# Patient Record
Sex: MALE | Race: OTHER | NOT HISPANIC OR LATINO
[De-identification: names, ages, dates, MRNs, and addresses within clinical notes are randomized per-mention and may not be internally consistent; named-entity substitution may affect disease eponyms.]

---

## 2017-03-15 PROBLEM — Z00.00 ENCOUNTER FOR PREVENTIVE HEALTH EXAMINATION: Status: ACTIVE | Noted: 2017-03-15

## 2017-04-13 ENCOUNTER — APPOINTMENT (OUTPATIENT)
Dept: ORTHOPEDIC SURGERY | Facility: CLINIC | Age: 67
End: 2017-04-13

## 2017-04-13 VITALS
HEART RATE: 74 BPM | TEMPERATURE: 98.2 F | SYSTOLIC BLOOD PRESSURE: 144 MMHG | DIASTOLIC BLOOD PRESSURE: 84 MMHG | BODY MASS INDEX: 28.25 KG/M2 | HEIGHT: 67 IN | WEIGHT: 180 LBS

## 2017-04-13 DIAGNOSIS — E78.00 PURE HYPERCHOLESTEROLEMIA, UNSPECIFIED: ICD-10-CM

## 2017-04-13 DIAGNOSIS — M72.0 PALMAR FASCIAL FIBROMATOSIS [DUPUYTREN]: ICD-10-CM

## 2017-04-13 DIAGNOSIS — M79.643 PAIN IN UNSPECIFIED HAND: ICD-10-CM

## 2017-04-13 DIAGNOSIS — Z83.3 FAMILY HISTORY OF DIABETES MELLITUS: ICD-10-CM

## 2017-04-13 DIAGNOSIS — Z82.49 FAMILY HISTORY OF ISCHEMIC HEART DISEASE AND OTHER DISEASES OF THE CIRCULATORY SYSTEM: ICD-10-CM

## 2017-04-13 DIAGNOSIS — Z86.79 PERSONAL HISTORY OF OTHER DISEASES OF THE CIRCULATORY SYSTEM: ICD-10-CM

## 2017-04-13 DIAGNOSIS — Z87.09 PERSONAL HISTORY OF OTHER DISEASES OF THE RESPIRATORY SYSTEM: ICD-10-CM

## 2017-04-13 DIAGNOSIS — Z86.39 PERSONAL HISTORY OF OTHER ENDOCRINE, NUTRITIONAL AND METABOLIC DISEASE: ICD-10-CM

## 2017-04-13 RX ORDER — ASPIRIN 81 MG
81 TABLET, DELAYED RELEASE (ENTERIC COATED) ORAL
Refills: 0 | Status: ACTIVE | COMMUNITY

## 2017-04-13 RX ORDER — LEVOTHYROXINE SODIUM 0.1 MG/1
100 TABLET ORAL
Refills: 0 | Status: ACTIVE | COMMUNITY

## 2017-04-13 RX ORDER — LOSARTAN POTASSIUM 100 MG/1
TABLET, FILM COATED ORAL
Refills: 0 | Status: ACTIVE | COMMUNITY

## 2017-04-13 RX ORDER — METOPROLOL TARTRATE 100 MG/1
100 TABLET, FILM COATED ORAL
Refills: 0 | Status: ACTIVE | COMMUNITY

## 2017-04-13 RX ORDER — CEPHALEXIN 500 MG/1
500 TABLET ORAL 3 TIMES DAILY
Qty: 9 | Refills: 0 | Status: ACTIVE | COMMUNITY
Start: 2017-04-13 | End: 1900-01-01

## 2017-04-13 RX ORDER — SIMVASTATIN 20 MG/1
20 TABLET, FILM COATED ORAL
Refills: 0 | Status: ACTIVE | COMMUNITY

## 2017-04-14 RX ORDER — LEVOTHYROXINE SODIUM 0.07 MG/1
75 TABLET ORAL
Qty: 90 | Refills: 0 | Status: ACTIVE | COMMUNITY
Start: 2016-10-21

## 2017-04-14 RX ORDER — POLYETHYLENE GLYCOL 3350 17 G/17G
17 POWDER, FOR SOLUTION ORAL
Qty: 28 | Refills: 0 | Status: ACTIVE | COMMUNITY
Start: 2016-12-08

## 2017-04-14 RX ORDER — SODIUM SULFATE, POTASSIUM SULFATE, MAGNESIUM SULFATE 17.5; 3.13; 1.6 G/ML; G/ML; G/ML
17.5-3.13-1.6 SOLUTION, CONCENTRATE ORAL
Qty: 354 | Refills: 0 | Status: ACTIVE | COMMUNITY
Start: 2016-12-14

## 2017-04-14 RX ORDER — OMEPRAZOLE 20 MG/1
20 CAPSULE, DELAYED RELEASE ORAL
Qty: 90 | Refills: 0 | Status: ACTIVE | COMMUNITY
Start: 2016-10-20

## 2017-04-14 RX ORDER — SIMVASTATIN 40 MG/1
40 TABLET, FILM COATED ORAL
Qty: 90 | Refills: 0 | Status: ACTIVE | COMMUNITY
Start: 2017-04-04

## 2017-04-14 RX ORDER — AZITHROMYCIN 250 MG/1
250 TABLET, FILM COATED ORAL
Qty: 6 | Refills: 0 | Status: ACTIVE | COMMUNITY
Start: 2017-03-08

## 2017-04-14 RX ORDER — METOPROLOL SUCCINATE 50 MG/1
50 TABLET, EXTENDED RELEASE ORAL
Qty: 180 | Refills: 0 | Status: ACTIVE | COMMUNITY
Start: 2016-10-20

## 2017-05-01 ENCOUNTER — APPOINTMENT (OUTPATIENT)
Dept: ORTHOPEDIC SURGERY | Facility: CLINIC | Age: 67
End: 2017-05-01

## 2017-05-01 VITALS
DIASTOLIC BLOOD PRESSURE: 80 MMHG | WEIGHT: 180 LBS | BODY MASS INDEX: 28.25 KG/M2 | HEIGHT: 67 IN | SYSTOLIC BLOOD PRESSURE: 142 MMHG | HEART RATE: 67 BPM

## 2017-11-20 ENCOUNTER — APPOINTMENT (OUTPATIENT)
Dept: ORTHOPEDIC SURGERY | Facility: CLINIC | Age: 67
End: 2017-11-20
Payer: MEDICARE

## 2017-11-20 VITALS
HEART RATE: 71 BPM | DIASTOLIC BLOOD PRESSURE: 73 MMHG | HEIGHT: 67 IN | WEIGHT: 180 LBS | BODY MASS INDEX: 28.25 KG/M2 | SYSTOLIC BLOOD PRESSURE: 130 MMHG

## 2017-11-20 PROCEDURE — 99215 OFFICE O/P EST HI 40 MIN: CPT

## 2018-01-09 ENCOUNTER — OUTPATIENT (OUTPATIENT)
Dept: OUTPATIENT SERVICES | Facility: HOSPITAL | Age: 68
LOS: 1 days | End: 2018-01-09
Payer: MEDICARE

## 2018-01-09 DIAGNOSIS — Z01.818 ENCOUNTER FOR OTHER PREPROCEDURAL EXAMINATION: ICD-10-CM

## 2018-01-09 LAB
ANION GAP SERPL CALC-SCNC: 13 MMOL/L — SIGNIFICANT CHANGE UP (ref 5–17)
APTT BLD: 27.2 SEC — LOW (ref 27.5–37.4)
BUN SERPL-MCNC: 17 MG/DL — SIGNIFICANT CHANGE UP (ref 8–20)
CALCIUM SERPL-MCNC: 9.5 MG/DL — SIGNIFICANT CHANGE UP (ref 8.6–10.2)
CHLORIDE SERPL-SCNC: 102 MMOL/L — SIGNIFICANT CHANGE UP (ref 98–107)
CO2 SERPL-SCNC: 26 MMOL/L — SIGNIFICANT CHANGE UP (ref 22–29)
CREAT SERPL-MCNC: 0.83 MG/DL — SIGNIFICANT CHANGE UP (ref 0.5–1.3)
GLUCOSE SERPL-MCNC: 88 MG/DL — SIGNIFICANT CHANGE UP (ref 70–115)
HCT VFR BLD CALC: 42.8 % — SIGNIFICANT CHANGE UP (ref 42–52)
HGB BLD-MCNC: 14.4 G/DL — SIGNIFICANT CHANGE UP (ref 14–18)
INR BLD: 0.96 RATIO — SIGNIFICANT CHANGE UP (ref 0.88–1.16)
MCHC RBC-ENTMCNC: 30.8 PG — SIGNIFICANT CHANGE UP (ref 27–31)
MCHC RBC-ENTMCNC: 33.6 G/DL — SIGNIFICANT CHANGE UP (ref 32–36)
MCV RBC AUTO: 91.6 FL — SIGNIFICANT CHANGE UP (ref 80–94)
PLATELET # BLD AUTO: 205 K/UL — SIGNIFICANT CHANGE UP (ref 150–400)
POTASSIUM SERPL-MCNC: 4.4 MMOL/L — SIGNIFICANT CHANGE UP (ref 3.5–5.3)
POTASSIUM SERPL-SCNC: 4.4 MMOL/L — SIGNIFICANT CHANGE UP (ref 3.5–5.3)
PROTHROM AB SERPL-ACNC: 10.5 SEC — SIGNIFICANT CHANGE UP (ref 9.8–12.7)
RBC # BLD: 4.67 M/UL — SIGNIFICANT CHANGE UP (ref 4.6–6.2)
RBC # FLD: 12.2 % — SIGNIFICANT CHANGE UP (ref 11–15.6)
SODIUM SERPL-SCNC: 141 MMOL/L — SIGNIFICANT CHANGE UP (ref 135–145)
WBC # BLD: 6.4 K/UL — SIGNIFICANT CHANGE UP (ref 4.8–10.8)
WBC # FLD AUTO: 6.4 K/UL — SIGNIFICANT CHANGE UP (ref 4.8–10.8)

## 2018-01-09 PROCEDURE — 36415 COLL VENOUS BLD VENIPUNCTURE: CPT

## 2018-01-09 PROCEDURE — 93010 ELECTROCARDIOGRAM REPORT: CPT

## 2018-01-09 PROCEDURE — 80048 BASIC METABOLIC PNL TOTAL CA: CPT

## 2018-01-09 PROCEDURE — 85027 COMPLETE CBC AUTOMATED: CPT

## 2018-01-09 PROCEDURE — 85730 THROMBOPLASTIN TIME PARTIAL: CPT

## 2018-01-09 PROCEDURE — G0463: CPT

## 2018-01-09 PROCEDURE — 93005 ELECTROCARDIOGRAM TRACING: CPT

## 2018-01-09 PROCEDURE — 85610 PROTHROMBIN TIME: CPT

## 2018-01-16 ENCOUNTER — APPOINTMENT (OUTPATIENT)
Dept: ORTHOPEDIC SURGERY | Facility: AMBULATORY SURGERY CENTER | Age: 68
End: 2018-01-16
Payer: MEDICARE

## 2018-01-16 ENCOUNTER — RESULT REVIEW (OUTPATIENT)
Age: 68
End: 2018-01-16

## 2018-01-16 PROCEDURE — 26123 RELEASE PALM CONTRACTURE: CPT | Mod: F9

## 2018-01-16 RX ORDER — HYDROCODONE BITARTRATE AND ACETAMINOPHEN 5; 325 MG/1; MG/1
5-325 TABLET ORAL
Qty: 8 | Refills: 0 | Status: ACTIVE | COMMUNITY
Start: 2018-01-16 | End: 1900-01-01

## 2018-01-23 ENCOUNTER — APPOINTMENT (OUTPATIENT)
Dept: ORTHOPEDIC SURGERY | Facility: CLINIC | Age: 68
End: 2018-01-23
Payer: MEDICARE

## 2018-01-23 VITALS
HEIGHT: 67 IN | SYSTOLIC BLOOD PRESSURE: 125 MMHG | WEIGHT: 180 LBS | DIASTOLIC BLOOD PRESSURE: 78 MMHG | HEART RATE: 71 BPM | BODY MASS INDEX: 28.25 KG/M2

## 2018-01-23 PROCEDURE — 99024 POSTOP FOLLOW-UP VISIT: CPT

## 2018-01-25 ENCOUNTER — OUTPATIENT (OUTPATIENT)
Dept: OUTPATIENT SERVICES | Facility: HOSPITAL | Age: 68
LOS: 1 days | End: 2018-01-25
Payer: MEDICARE

## 2018-01-25 DIAGNOSIS — M72.0 PALMAR FASCIAL FIBROMATOSIS [DUPUYTREN]: ICD-10-CM

## 2018-01-25 DIAGNOSIS — M25.641 STIFFNESS OF RIGHT HAND, NOT ELSEWHERE CLASSIFIED: ICD-10-CM

## 2018-01-25 DIAGNOSIS — Z51.89 ENCOUNTER FOR OTHER SPECIFIED AFTERCARE: ICD-10-CM

## 2018-01-29 ENCOUNTER — APPOINTMENT (OUTPATIENT)
Dept: ORTHOPEDIC SURGERY | Facility: CLINIC | Age: 68
End: 2018-01-29
Payer: MEDICARE

## 2018-01-29 VITALS
WEIGHT: 180 LBS | DIASTOLIC BLOOD PRESSURE: 86 MMHG | HEART RATE: 65 BPM | HEIGHT: 67 IN | SYSTOLIC BLOOD PRESSURE: 138 MMHG | BODY MASS INDEX: 28.25 KG/M2

## 2018-01-29 PROCEDURE — 99024 POSTOP FOLLOW-UP VISIT: CPT

## 2018-02-15 ENCOUNTER — APPOINTMENT (OUTPATIENT)
Dept: ORTHOPEDIC SURGERY | Facility: CLINIC | Age: 68
End: 2018-02-15
Payer: MEDICARE

## 2018-02-15 VITALS
DIASTOLIC BLOOD PRESSURE: 82 MMHG | HEIGHT: 67 IN | BODY MASS INDEX: 28.25 KG/M2 | WEIGHT: 180 LBS | SYSTOLIC BLOOD PRESSURE: 129 MMHG | HEART RATE: 69 BPM

## 2018-02-15 DIAGNOSIS — M72.0 PALMAR FASCIAL FIBROMATOSIS [DUPUYTREN]: ICD-10-CM

## 2018-02-15 PROCEDURE — 99024 POSTOP FOLLOW-UP VISIT: CPT

## 2018-02-16 PROCEDURE — 97110 THERAPEUTIC EXERCISES: CPT

## 2018-02-16 PROCEDURE — G8985: CPT | Mod: CK

## 2018-02-16 PROCEDURE — G8984: CPT | Mod: CL

## 2018-02-16 PROCEDURE — 97140 MANUAL THERAPY 1/> REGIONS: CPT

## 2018-02-16 PROCEDURE — G8986: CPT | Mod: CH

## 2018-02-16 PROCEDURE — 97750 PHYSICAL PERFORMANCE TEST: CPT

## 2018-02-16 PROCEDURE — 97165 OT EVAL LOW COMPLEX 30 MIN: CPT

## 2018-10-15 ENCOUNTER — INPATIENT (INPATIENT)
Facility: HOSPITAL | Age: 68
LOS: 4 days | Discharge: HOME | DRG: 517 | End: 2018-10-20
Attending: SURGERY | Admitting: SURGERY
Payer: MEDICARE

## 2018-10-15 ENCOUNTER — TRANSCRIPTION ENCOUNTER (OUTPATIENT)
Age: 68
End: 2018-10-15

## 2018-10-15 VITALS
SYSTOLIC BLOOD PRESSURE: 99 MMHG | DIASTOLIC BLOOD PRESSURE: 64 MMHG | HEART RATE: 76 BPM | RESPIRATION RATE: 20 BRPM | WEIGHT: 179.9 LBS | HEIGHT: 70 IN

## 2018-10-15 DIAGNOSIS — S09.90XA UNSPECIFIED INJURY OF HEAD, INITIAL ENCOUNTER: ICD-10-CM

## 2018-10-15 LAB
ABO RH CONFIRMATION: SIGNIFICANT CHANGE UP
ALBUMIN SERPL ELPH-MCNC: 4.2 G/DL — SIGNIFICANT CHANGE UP (ref 3.3–5.2)
ALP SERPL-CCNC: 68 U/L — SIGNIFICANT CHANGE UP (ref 40–120)
ALT FLD-CCNC: 16 U/L — SIGNIFICANT CHANGE UP
AMPHET UR-MCNC: NEGATIVE — SIGNIFICANT CHANGE UP
ANION GAP SERPL CALC-SCNC: 13 MMOL/L — SIGNIFICANT CHANGE UP (ref 5–17)
ANION GAP SERPL CALC-SCNC: 13 MMOL/L — SIGNIFICANT CHANGE UP (ref 5–17)
APPEARANCE UR: CLEAR — SIGNIFICANT CHANGE UP
APTT BLD: 21.8 SEC — LOW (ref 27.5–37.4)
AST SERPL-CCNC: 17 U/L — SIGNIFICANT CHANGE UP
BARBITURATES UR SCN-MCNC: NEGATIVE — SIGNIFICANT CHANGE UP
BASOPHILS # BLD AUTO: 0 K/UL — SIGNIFICANT CHANGE UP (ref 0–0.2)
BASOPHILS NFR BLD AUTO: 0.3 % — SIGNIFICANT CHANGE UP (ref 0–2)
BENZODIAZ UR-MCNC: NEGATIVE — SIGNIFICANT CHANGE UP
BILIRUB SERPL-MCNC: 0.3 MG/DL — LOW (ref 0.4–2)
BILIRUB UR-MCNC: NEGATIVE — SIGNIFICANT CHANGE UP
BLD GP AB SCN SERPL QL: SIGNIFICANT CHANGE UP
BUN SERPL-MCNC: 19 MG/DL — SIGNIFICANT CHANGE UP (ref 8–20)
BUN SERPL-MCNC: 24 MG/DL — HIGH (ref 8–20)
CALCIUM SERPL-MCNC: 8.9 MG/DL — SIGNIFICANT CHANGE UP (ref 8.6–10.2)
CALCIUM SERPL-MCNC: 9 MG/DL — SIGNIFICANT CHANGE UP (ref 8.6–10.2)
CHLORIDE SERPL-SCNC: 102 MMOL/L — SIGNIFICANT CHANGE UP (ref 98–107)
CHLORIDE SERPL-SCNC: 102 MMOL/L — SIGNIFICANT CHANGE UP (ref 98–107)
CO2 SERPL-SCNC: 23 MMOL/L — SIGNIFICANT CHANGE UP (ref 22–29)
CO2 SERPL-SCNC: 24 MMOL/L — SIGNIFICANT CHANGE UP (ref 22–29)
COCAINE METAB.OTHER UR-MCNC: NEGATIVE — SIGNIFICANT CHANGE UP
COLOR SPEC: YELLOW — SIGNIFICANT CHANGE UP
CREAT SERPL-MCNC: 0.72 MG/DL — SIGNIFICANT CHANGE UP (ref 0.5–1.3)
CREAT SERPL-MCNC: 0.83 MG/DL — SIGNIFICANT CHANGE UP (ref 0.5–1.3)
DIFF PNL FLD: NEGATIVE — SIGNIFICANT CHANGE UP
EOSINOPHIL # BLD AUTO: 0.1 K/UL — SIGNIFICANT CHANGE UP (ref 0–0.5)
EOSINOPHIL NFR BLD AUTO: 1.3 % — SIGNIFICANT CHANGE UP (ref 0–5)
ETHANOL SERPL-MCNC: <10 MG/DL — SIGNIFICANT CHANGE UP
GAS PNL BLDV: SIGNIFICANT CHANGE UP
GLUCOSE SERPL-MCNC: 128 MG/DL — HIGH (ref 70–115)
GLUCOSE SERPL-MCNC: 99 MG/DL — SIGNIFICANT CHANGE UP (ref 70–115)
GLUCOSE UR QL: NEGATIVE MG/DL — SIGNIFICANT CHANGE UP
HCO3 BLDV-SCNC: 26 MMOL/L — SIGNIFICANT CHANGE UP (ref 21–29)
HCT VFR BLD CALC: 37.3 % — LOW (ref 42–52)
HCT VFR BLD CALC: 38.9 % — LOW (ref 42–52)
HGB BLD-MCNC: 12.3 G/DL — LOW (ref 14–18)
HGB BLD-MCNC: 13 G/DL — LOW (ref 14–18)
INR BLD: 0.96 RATIO — SIGNIFICANT CHANGE UP (ref 0.88–1.16)
KETONES UR-MCNC: NEGATIVE — SIGNIFICANT CHANGE UP
LACTATE BLDV-MCNC: 1.4 MMOL/L — SIGNIFICANT CHANGE UP (ref 0.5–2)
LACTATE SERPL-SCNC: 1.2 MMOL/L — SIGNIFICANT CHANGE UP (ref 0.5–2)
LEUKOCYTE ESTERASE UR-ACNC: NEGATIVE — SIGNIFICANT CHANGE UP
LIDOCAIN IGE QN: 26 U/L — SIGNIFICANT CHANGE UP (ref 22–51)
LYMPHOCYTES # BLD AUTO: 2.8 K/UL — SIGNIFICANT CHANGE UP (ref 1–4.8)
LYMPHOCYTES # BLD AUTO: 35.8 % — SIGNIFICANT CHANGE UP (ref 20–55)
MCHC RBC-ENTMCNC: 30.4 PG — SIGNIFICANT CHANGE UP (ref 27–31)
MCHC RBC-ENTMCNC: 30.7 PG — SIGNIFICANT CHANGE UP (ref 27–31)
MCHC RBC-ENTMCNC: 33 G/DL — SIGNIFICANT CHANGE UP (ref 32–36)
MCHC RBC-ENTMCNC: 33.4 G/DL — SIGNIFICANT CHANGE UP (ref 32–36)
MCV RBC AUTO: 92 FL — SIGNIFICANT CHANGE UP (ref 80–94)
MCV RBC AUTO: 92.1 FL — SIGNIFICANT CHANGE UP (ref 80–94)
METHADONE UR-MCNC: NEGATIVE — SIGNIFICANT CHANGE UP
MONOCYTES # BLD AUTO: 0.5 K/UL — SIGNIFICANT CHANGE UP (ref 0–0.8)
MONOCYTES NFR BLD AUTO: 6.9 % — SIGNIFICANT CHANGE UP (ref 3–10)
NEUTROPHILS # BLD AUTO: 4.3 K/UL — SIGNIFICANT CHANGE UP (ref 1.8–8)
NEUTROPHILS NFR BLD AUTO: 54.8 % — SIGNIFICANT CHANGE UP (ref 37–73)
NITRITE UR-MCNC: NEGATIVE — SIGNIFICANT CHANGE UP
OPIATES UR-MCNC: NEGATIVE — SIGNIFICANT CHANGE UP
PCO2 BLDV: 47 MMHG — SIGNIFICANT CHANGE UP (ref 35–50)
PCP SPEC-MCNC: SIGNIFICANT CHANGE UP
PCP UR-MCNC: NEGATIVE — SIGNIFICANT CHANGE UP
PH BLDV: 7.38 — SIGNIFICANT CHANGE UP (ref 7.32–7.43)
PH UR: 6 — SIGNIFICANT CHANGE UP (ref 5–8)
PLATELET # BLD AUTO: 205 K/UL — SIGNIFICANT CHANGE UP (ref 150–400)
PLATELET # BLD AUTO: 208 K/UL — SIGNIFICANT CHANGE UP (ref 150–400)
PO2 BLDV: 40 MMHG — SIGNIFICANT CHANGE UP (ref 25–45)
POTASSIUM SERPL-MCNC: 4 MMOL/L — SIGNIFICANT CHANGE UP (ref 3.5–5.3)
POTASSIUM SERPL-MCNC: 4.1 MMOL/L — SIGNIFICANT CHANGE UP (ref 3.5–5.3)
POTASSIUM SERPL-SCNC: 4 MMOL/L — SIGNIFICANT CHANGE UP (ref 3.5–5.3)
POTASSIUM SERPL-SCNC: 4.1 MMOL/L — SIGNIFICANT CHANGE UP (ref 3.5–5.3)
PROT SERPL-MCNC: 7.1 G/DL — SIGNIFICANT CHANGE UP (ref 6.6–8.7)
PROT UR-MCNC: NEGATIVE MG/DL — SIGNIFICANT CHANGE UP
PROTHROM AB SERPL-ACNC: 10.6 SEC — SIGNIFICANT CHANGE UP (ref 9.8–12.7)
RBC # BLD: 4.05 M/UL — LOW (ref 4.6–6.2)
RBC # BLD: 4.23 M/UL — LOW (ref 4.6–6.2)
RBC # FLD: 12.2 % — SIGNIFICANT CHANGE UP (ref 11–15.6)
RBC # FLD: 12.3 % — SIGNIFICANT CHANGE UP (ref 11–15.6)
SAO2 % BLDV: 75 % — SIGNIFICANT CHANGE UP
SODIUM SERPL-SCNC: 138 MMOL/L — SIGNIFICANT CHANGE UP (ref 135–145)
SODIUM SERPL-SCNC: 139 MMOL/L — SIGNIFICANT CHANGE UP (ref 135–145)
SP GR SPEC: 1.01 — SIGNIFICANT CHANGE UP (ref 1.01–1.02)
THC UR QL: NEGATIVE — SIGNIFICANT CHANGE UP
TSH SERPL-MCNC: 1.11 UIU/ML — SIGNIFICANT CHANGE UP (ref 0.27–4.2)
TYPE + AB SCN PNL BLD: SIGNIFICANT CHANGE UP
UROBILINOGEN FLD QL: NEGATIVE MG/DL — SIGNIFICANT CHANGE UP
WBC # BLD: 7.8 K/UL — SIGNIFICANT CHANGE UP (ref 4.8–10.8)
WBC # BLD: 8.9 K/UL — SIGNIFICANT CHANGE UP (ref 4.8–10.8)
WBC # FLD AUTO: 7.8 K/UL — SIGNIFICANT CHANGE UP (ref 4.8–10.8)
WBC # FLD AUTO: 8.9 K/UL — SIGNIFICANT CHANGE UP (ref 4.8–10.8)

## 2018-10-15 PROCEDURE — 99222 1ST HOSP IP/OBS MODERATE 55: CPT | Mod: 57

## 2018-10-15 PROCEDURE — 71045 X-RAY EXAM CHEST 1 VIEW: CPT | Mod: 26

## 2018-10-15 PROCEDURE — 73110 X-RAY EXAM OF WRIST: CPT | Mod: 26,RT

## 2018-10-15 RX ORDER — SODIUM CHLORIDE 9 MG/ML
1000 INJECTION INTRAMUSCULAR; INTRAVENOUS; SUBCUTANEOUS ONCE
Qty: 0 | Refills: 0 | Status: COMPLETED | OUTPATIENT
Start: 2018-10-15 | End: 2018-10-15

## 2018-10-15 RX ORDER — LEVOTHYROXINE SODIUM 125 MCG
75 TABLET ORAL DAILY
Qty: 0 | Refills: 0 | Status: DISCONTINUED | OUTPATIENT
Start: 2018-10-15 | End: 2018-10-16

## 2018-10-15 RX ORDER — METOPROLOL TARTRATE 50 MG
50 TABLET ORAL
Qty: 0 | Refills: 0 | Status: DISCONTINUED | OUTPATIENT
Start: 2018-10-15 | End: 2018-10-16

## 2018-10-15 RX ORDER — LIDOCAINE HCL 20 MG/ML
20 VIAL (ML) INJECTION ONCE
Qty: 0 | Refills: 0 | Status: COMPLETED | OUTPATIENT
Start: 2018-10-15 | End: 2018-10-15

## 2018-10-15 RX ORDER — SODIUM CHLORIDE 9 MG/ML
1000 INJECTION, SOLUTION INTRAVENOUS
Qty: 0 | Refills: 0 | Status: DISCONTINUED | OUTPATIENT
Start: 2018-10-15 | End: 2018-10-16

## 2018-10-15 RX ORDER — LOSARTAN POTASSIUM 100 MG/1
50 TABLET, FILM COATED ORAL DAILY
Qty: 0 | Refills: 0 | Status: DISCONTINUED | OUTPATIENT
Start: 2018-10-15 | End: 2018-10-16

## 2018-10-15 RX ADMIN — Medication 20 MILLILITER(S): at 16:30

## 2018-10-15 RX ADMIN — SODIUM CHLORIDE 1000 MILLILITER(S): 9 INJECTION INTRAMUSCULAR; INTRAVENOUS; SUBCUTANEOUS at 16:40

## 2018-10-15 RX ADMIN — Medication 50 MILLIGRAM(S): at 23:39

## 2018-10-15 RX ADMIN — SODIUM CHLORIDE 1000 MILLILITER(S): 9 INJECTION INTRAMUSCULAR; INTRAVENOUS; SUBCUTANEOUS at 15:50

## 2018-10-15 NOTE — CONSULT NOTE ADULT - SUBJECTIVE AND OBJECTIVE BOX
Asked by the ACS team to evaluate a 68 year old male who was brought in be EMS as a trauma response is found laying on stretcher without much complaints.  He does say that he Right leg doesn't feel right and is unable to raise his leg.  He states that he was on a ladder and it fell causing him to hit the ground.  X-Rays of the right knee show patient to have a fracture of his Right Patella. Patient will be admitted to the ACS service for observation.      PMHx:  Hypothyroidism  Hypertension    PSHx:  unknown    Allergies:  NKDA    Review of Systems:  Muscular Skeletal: Right Knee Pain following fall  Remaining systems were reviewed and found to be negative    Physical;  Vital Signs Last 24 Hrs  T(C): --  T(F): --  HR: 76 (15 Oct 2018 15:42) (76 - 76)  BP: 99/64 (15 Oct 2018 15:42) (99/64 - 99/64)  BP(mean): --  RR: 20 (15 Oct 2018 15:42) (20 - 20)  SpO2: --    Right Lower Extremity  a few areas of skin abrasions  + ecchymosis noted near the knee  Calf soft, non-tender  2+ Pedal pulse  unable to straight leg raise  + Sensation    X-Rays: Right Knee: + Patella Fracture    A/P: Right Patella Fracture  - Knee Immobilizer Applied  - Pain medication   - Medical Optimization for surgery  - Non-weightbearing

## 2018-10-15 NOTE — ED PROVIDER NOTE - PROGRESS NOTE DETAILS
Pt. with right platela fracture. Pt. will be admitted under the trauma team. Pt. will be admitted under Dr. Gimenez's service for head injury and Right Patella fracture.

## 2018-10-15 NOTE — H&P ADULT - HISTORY OF PRESENT ILLNESS
A: Protected, patient conversing  B: CTAB. Symmetrical chest rise  C: 2+ central (femoral) & peripheral pulses (Radial, DP)  D: GCS 15, MAEO, interacting. No mary disability noted  E: No gross deformities on primary exposure    Vitals:  Temp:   HR:  BP:  RR:   SpO2: %    CXR: Negative for evidence of hemo/pneumothorax HPI: Japanese speaking 68 year old man BIBMES to [facility] after sustaining a 10 ft fall from a ladder while pruning trees at home. Patient denies LOC, GCS 15. Presented to trauma bay with a 3 cm right forehead laceration and a RLE external splint for immobilization due to a RLE deformity. Patient was no in acute distress and hemodynamically stable.       Primary Survey:  A: Protected, patient conversing  B: CTAB. Symmetrical chest rise  C: 2+ central (femoral) & peripheral pulses (Radial, DP)  D: GCS 15, 2mm pupils b/l, MAEO, interacting. No mary disability noted  E: Right patellar deformity seen on flexion      Vitals:  Temp: 98.7F   HR: 74 BP: 140/82 RR: 18  SpO2: 98%    Procedures/Interventions:  - CXR: Negative for evidence of hemo/pneumothorax  - Dx pelvis  - Dx right knee- patellar fracture  -Dx right femur  - Forehead laceration debridement   - Forehead laceration repair  - Adacel     - 1% lidocaine for laceration repair    Secondary Survery:  Constitutional: Well-developed well nourished Male in no acute distress  HEENT: Right forehead 3 cm laceration with minimal bleeding, head is normocephalic, maxillofacial structures stable, no blood or discharge from nares or oral cavity, no gray sign / racoon eyes, EOMI b/l, pupils 2 mm round and reactive to light b/l, no active drainage or redness  Neck: trachea midline  Respiratory: Breath sounds CTA b/l respirations are unlabored, no accessory muscle use, no conversational dyspnea  Cardiovascular: Regular rate & rhythm, +S1, S2  Chest: Chest wall is non-tender to palpation, no subQ emphysema or crepitus palpated  Gastrointestinal: Abdomen soft, non-tender, non-distended, no rebound tenderness / guarding, no ecchymosis or external signs of abdominal trauma  Extremities: Right patellar deformity as seen during flexion, pain to palpation of right patella. No gross deformities nor point tenderness of the remaining right lower extremity. Left lower extremity intact and with no tenderness. No deformities nor tenderness of upper extremities b/l.   Pelvis: stable  Vascular: 2+ radial, femoral, and DP pulses b/l  Neurological: GCS: 15 (4/5/6). A&O x 3; no gross sensory / motor / coordination deficits  Musculoskeletal: 5/5 strength of upper and lower extremities b/l  Back: no C/T/LS spine tenderness to palpation, no step-offs or signs of external trauma to the back

## 2018-10-15 NOTE — H&P ADULT - ATTENDING COMMENTS
TRAUMA TEAM ACTIVATION    The patient was seen and examined  Details per the resident's H&P  This is a 68-year old man who fell 10 feet from a ladder  No LOC  No prehospital hypotension    Airway is intact  Bilateral breath sounds  Hemodynamically normal, BE=+1  GCS=15, pupils are equal and reactive  R LE in splints    CXR:  No PTX  PXR:  No fracture    Exam:  Face:  3 cm stellate laceration  R LE:  Knee tenderness, N/V intact    Impression:  S/P fall from ladder, 10 feet  Right patellar fracture  Forehead laceration    Plan:  Admit  Orthopedics consult  Repair laceration  Will r/o abdomen clinically  DVT prophylaxis

## 2018-10-15 NOTE — ED PROVIDER NOTE - OBJECTIVE STATEMENT
Pt. present to ED as a trauma alert. Pt. was on a ladder cutting trees when he fell. NO LOC. NO neck pain. Pt. in the ED denies any pain. However, pt. has deformity to right knee. Pt. also with laceration to right forehead. Pt. present with C-collar placed by EMS. Airway intact. Care transferred over to Trauma team upon arrival.

## 2018-10-15 NOTE — H&P ADULT - ASSESSMENT
A/P:  68 year old man s/p 10 ft fall from a ladder sustaining a right forehead laceration and a right patellar fracture seen to be hemodynamically stable to be admitted for orthopedic evaluation for operative repair of the patella.    - Admit to trauma  - q6 neuro checks  - Ortho to evaluate for OR  - Preoperative workup  - f/u radiological reads  - Start home meds  - Pain management A/P:  68 year old man s/p 10 ft fall from a ladder sustaining a right forehead laceration and a right patellar fracture seen to be hemodynamically stable to be admitted for orthopedic evaluation for operative repair of the patella. From a trauma/surgical standpoint, patient is cleared for OR.    - Admit to trauma  - q6 neuro checks  - Ortho to evaluate for OR  - Preoperative workup  - f/u radiological reads  - Start home meds  - Pain management

## 2018-10-15 NOTE — ED ADULT NURSE REASSESSMENT NOTE - NS ED NURSE REASSESS COMMENT FT1
Report received from off going RN, charting as noted. Patient A&Ox4 complaining of pain to right wrist, minor swelling noted to lateral aspect of wrist. + CMS. Dressing in place to right forehead above eye, C/D/I. Denies any chest pain, shortness of breath, nausea or dizziness. Respirations even & unlabored, denies any numbness or tingling. Saline lock in place, patent, negative s/s phlebitis or infiltration. Plan of care discussed, all questions answered. Will monitor.

## 2018-10-15 NOTE — ED ADULT TRIAGE NOTE - CHIEF COMPLAINT QUOTE
Patient brought in by ambulance A/Ox3, fall from ladder 10 feet, +deformity to right leg, laceration to head, - LOC, -Blood thinners, Trauma requested by EMS.

## 2018-10-16 ENCOUNTER — TRANSCRIPTION ENCOUNTER (OUTPATIENT)
Age: 68
End: 2018-10-16

## 2018-10-16 LAB
ANION GAP SERPL CALC-SCNC: 11 MMOL/L — SIGNIFICANT CHANGE UP (ref 5–17)
ANION GAP SERPL CALC-SCNC: 12 MMOL/L — SIGNIFICANT CHANGE UP (ref 5–17)
BASOPHILS # BLD AUTO: 0 K/UL — SIGNIFICANT CHANGE UP (ref 0–0.2)
BASOPHILS NFR BLD AUTO: 0.2 % — SIGNIFICANT CHANGE UP (ref 0–2)
BUN SERPL-MCNC: 14 MG/DL — SIGNIFICANT CHANGE UP (ref 8–20)
BUN SERPL-MCNC: 16 MG/DL — SIGNIFICANT CHANGE UP (ref 8–20)
CALCIUM SERPL-MCNC: 8.5 MG/DL — LOW (ref 8.6–10.2)
CALCIUM SERPL-MCNC: 8.9 MG/DL — SIGNIFICANT CHANGE UP (ref 8.6–10.2)
CHLORIDE SERPL-SCNC: 100 MMOL/L — SIGNIFICANT CHANGE UP (ref 98–107)
CHLORIDE SERPL-SCNC: 102 MMOL/L — SIGNIFICANT CHANGE UP (ref 98–107)
CO2 SERPL-SCNC: 24 MMOL/L — SIGNIFICANT CHANGE UP (ref 22–29)
CO2 SERPL-SCNC: 24 MMOL/L — SIGNIFICANT CHANGE UP (ref 22–29)
CREAT SERPL-MCNC: 0.71 MG/DL — SIGNIFICANT CHANGE UP (ref 0.5–1.3)
CREAT SERPL-MCNC: 0.71 MG/DL — SIGNIFICANT CHANGE UP (ref 0.5–1.3)
EOSINOPHIL # BLD AUTO: 0 K/UL — SIGNIFICANT CHANGE UP (ref 0–0.5)
EOSINOPHIL NFR BLD AUTO: 0.2 % — SIGNIFICANT CHANGE UP (ref 0–5)
GLUCOSE SERPL-MCNC: 100 MG/DL — SIGNIFICANT CHANGE UP (ref 70–115)
GLUCOSE SERPL-MCNC: 145 MG/DL — HIGH (ref 70–115)
HCT VFR BLD CALC: 35.2 % — LOW (ref 42–52)
HCT VFR BLD CALC: 35.5 % — LOW (ref 42–52)
HGB BLD-MCNC: 11.6 G/DL — LOW (ref 14–18)
HGB BLD-MCNC: 12 G/DL — LOW (ref 14–18)
LACTATE SERPL-SCNC: 1.2 MMOL/L — SIGNIFICANT CHANGE UP (ref 0.5–2)
LYMPHOCYTES # BLD AUTO: 0.8 K/UL — LOW (ref 1–4.8)
LYMPHOCYTES # BLD AUTO: 13.1 % — LOW (ref 20–55)
MCHC RBC-ENTMCNC: 30.4 PG — SIGNIFICANT CHANGE UP (ref 27–31)
MCHC RBC-ENTMCNC: 30.6 PG — SIGNIFICANT CHANGE UP (ref 27–31)
MCHC RBC-ENTMCNC: 33 G/DL — SIGNIFICANT CHANGE UP (ref 32–36)
MCHC RBC-ENTMCNC: 33.8 G/DL — SIGNIFICANT CHANGE UP (ref 32–36)
MCV RBC AUTO: 90.6 FL — SIGNIFICANT CHANGE UP (ref 80–94)
MCV RBC AUTO: 92.4 FL — SIGNIFICANT CHANGE UP (ref 80–94)
MONOCYTES # BLD AUTO: 0.2 K/UL — SIGNIFICANT CHANGE UP (ref 0–0.8)
MONOCYTES NFR BLD AUTO: 3.2 % — SIGNIFICANT CHANGE UP (ref 3–10)
NEUTROPHILS # BLD AUTO: 5.1 K/UL — SIGNIFICANT CHANGE UP (ref 1.8–8)
NEUTROPHILS NFR BLD AUTO: 82.2 % — HIGH (ref 37–73)
PLATELET # BLD AUTO: 165 K/UL — SIGNIFICANT CHANGE UP (ref 150–400)
PLATELET # BLD AUTO: 213 K/UL — SIGNIFICANT CHANGE UP (ref 150–400)
POTASSIUM SERPL-MCNC: 4.2 MMOL/L — SIGNIFICANT CHANGE UP (ref 3.5–5.3)
POTASSIUM SERPL-MCNC: 4.4 MMOL/L — SIGNIFICANT CHANGE UP (ref 3.5–5.3)
POTASSIUM SERPL-SCNC: 4.2 MMOL/L — SIGNIFICANT CHANGE UP (ref 3.5–5.3)
POTASSIUM SERPL-SCNC: 4.4 MMOL/L — SIGNIFICANT CHANGE UP (ref 3.5–5.3)
RBC # BLD: 3.81 M/UL — LOW (ref 4.6–6.2)
RBC # BLD: 3.92 M/UL — LOW (ref 4.6–6.2)
RBC # FLD: 11.9 % — SIGNIFICANT CHANGE UP (ref 11–15.6)
RBC # FLD: 12.4 % — SIGNIFICANT CHANGE UP (ref 11–15.6)
SODIUM SERPL-SCNC: 136 MMOL/L — SIGNIFICANT CHANGE UP (ref 135–145)
SODIUM SERPL-SCNC: 137 MMOL/L — SIGNIFICANT CHANGE UP (ref 135–145)
WBC # BLD: 6.2 K/UL — SIGNIFICANT CHANGE UP (ref 4.8–10.8)
WBC # BLD: 7 K/UL — SIGNIFICANT CHANGE UP (ref 4.8–10.8)
WBC # FLD AUTO: 6.2 K/UL — SIGNIFICANT CHANGE UP (ref 4.8–10.8)
WBC # FLD AUTO: 7 K/UL — SIGNIFICANT CHANGE UP (ref 4.8–10.8)

## 2018-10-16 PROCEDURE — 27524 TREAT KNEECAP FRACTURE: CPT | Mod: RT

## 2018-10-16 PROCEDURE — 76001: CPT | Mod: 26

## 2018-10-16 PROCEDURE — 93010 ELECTROCARDIOGRAM REPORT: CPT

## 2018-10-16 PROCEDURE — 27524 TREAT KNEECAP FRACTURE: CPT | Mod: AS,RT

## 2018-10-16 PROCEDURE — 73560 X-RAY EXAM OF KNEE 1 OR 2: CPT | Mod: 26,RT

## 2018-10-16 RX ORDER — ONDANSETRON 8 MG/1
4 TABLET, FILM COATED ORAL EVERY 6 HOURS
Qty: 0 | Refills: 0 | Status: DISCONTINUED | OUTPATIENT
Start: 2018-10-16 | End: 2018-10-20

## 2018-10-16 RX ORDER — HYDROMORPHONE HYDROCHLORIDE 2 MG/ML
0.5 INJECTION INTRAMUSCULAR; INTRAVENOUS; SUBCUTANEOUS EVERY 4 HOURS
Qty: 0 | Refills: 0 | Status: DISCONTINUED | OUTPATIENT
Start: 2018-10-16 | End: 2018-10-16

## 2018-10-16 RX ORDER — DOCUSATE SODIUM 100 MG
100 CAPSULE ORAL THREE TIMES A DAY
Qty: 0 | Refills: 0 | Status: DISCONTINUED | OUTPATIENT
Start: 2018-10-16 | End: 2018-10-20

## 2018-10-16 RX ORDER — KETOROLAC TROMETHAMINE 30 MG/ML
15 SYRINGE (ML) INJECTION ONCE
Qty: 0 | Refills: 0 | Status: DISCONTINUED | OUTPATIENT
Start: 2018-10-16 | End: 2018-10-16

## 2018-10-16 RX ORDER — ACETAMINOPHEN 500 MG
976 TABLET ORAL EVERY 8 HOURS
Qty: 0 | Refills: 0 | Status: DISCONTINUED | OUTPATIENT
Start: 2018-10-16 | End: 2018-10-16

## 2018-10-16 RX ORDER — ENOXAPARIN SODIUM 100 MG/ML
40 INJECTION SUBCUTANEOUS EVERY 24 HOURS
Qty: 0 | Refills: 0 | Status: DISCONTINUED | OUTPATIENT
Start: 2018-10-17 | End: 2018-10-20

## 2018-10-16 RX ORDER — METOPROLOL TARTRATE 50 MG
50 TABLET ORAL
Qty: 0 | Refills: 0 | Status: DISCONTINUED | OUTPATIENT
Start: 2018-10-16 | End: 2018-10-16

## 2018-10-16 RX ORDER — ONDANSETRON 8 MG/1
4 TABLET, FILM COATED ORAL ONCE
Qty: 0 | Refills: 0 | Status: DISCONTINUED | OUTPATIENT
Start: 2018-10-16 | End: 2018-10-16

## 2018-10-16 RX ORDER — OXYCODONE HYDROCHLORIDE 5 MG/1
10 TABLET ORAL EVERY 4 HOURS
Qty: 0 | Refills: 0 | Status: DISCONTINUED | OUTPATIENT
Start: 2018-10-16 | End: 2018-10-20

## 2018-10-16 RX ORDER — TAMSULOSIN HYDROCHLORIDE 0.4 MG/1
0.4 CAPSULE ORAL AT BEDTIME
Qty: 0 | Refills: 0 | Status: DISCONTINUED | OUTPATIENT
Start: 2018-10-16 | End: 2018-10-20

## 2018-10-16 RX ORDER — ASCORBIC ACID 60 MG
500 TABLET,CHEWABLE ORAL
Qty: 0 | Refills: 0 | Status: DISCONTINUED | OUTPATIENT
Start: 2018-10-16 | End: 2018-10-20

## 2018-10-16 RX ORDER — ACETAMINOPHEN 500 MG
650 TABLET ORAL EVERY 6 HOURS
Qty: 0 | Refills: 0 | Status: DISCONTINUED | OUTPATIENT
Start: 2018-10-16 | End: 2018-10-16

## 2018-10-16 RX ORDER — HYDROMORPHONE HYDROCHLORIDE 2 MG/ML
1 INJECTION INTRAMUSCULAR; INTRAVENOUS; SUBCUTANEOUS EVERY 4 HOURS
Qty: 0 | Refills: 0 | Status: DISCONTINUED | OUTPATIENT
Start: 2018-10-16 | End: 2018-10-16

## 2018-10-16 RX ORDER — SODIUM CHLORIDE 9 MG/ML
1000 INJECTION INTRAMUSCULAR; INTRAVENOUS; SUBCUTANEOUS
Qty: 0 | Refills: 0 | Status: DISCONTINUED | OUTPATIENT
Start: 2018-10-16 | End: 2018-10-17

## 2018-10-16 RX ORDER — FERROUS SULFATE 325(65) MG
325 TABLET ORAL
Qty: 0 | Refills: 0 | Status: DISCONTINUED | OUTPATIENT
Start: 2018-10-16 | End: 2018-10-20

## 2018-10-16 RX ORDER — FOLIC ACID 0.8 MG
1 TABLET ORAL DAILY
Qty: 0 | Refills: 0 | Status: DISCONTINUED | OUTPATIENT
Start: 2018-10-16 | End: 2018-10-20

## 2018-10-16 RX ORDER — LEVOTHYROXINE SODIUM 125 MCG
75 TABLET ORAL DAILY
Qty: 0 | Refills: 0 | Status: DISCONTINUED | OUTPATIENT
Start: 2018-10-16 | End: 2018-10-20

## 2018-10-16 RX ORDER — FENTANYL CITRATE 50 UG/ML
50 INJECTION INTRAVENOUS
Qty: 0 | Refills: 0 | Status: DISCONTINUED | OUTPATIENT
Start: 2018-10-16 | End: 2018-10-16

## 2018-10-16 RX ORDER — ACETAMINOPHEN 500 MG
975 TABLET ORAL EVERY 8 HOURS
Qty: 0 | Refills: 0 | Status: DISCONTINUED | OUTPATIENT
Start: 2018-10-16 | End: 2018-10-17

## 2018-10-16 RX ORDER — CEFAZOLIN SODIUM 1 G
2000 VIAL (EA) INJECTION EVERY 8 HOURS
Qty: 0 | Refills: 0 | Status: COMPLETED | OUTPATIENT
Start: 2018-10-16 | End: 2018-10-17

## 2018-10-16 RX ORDER — LOSARTAN POTASSIUM 100 MG/1
50 TABLET, FILM COATED ORAL DAILY
Qty: 0 | Refills: 0 | Status: DISCONTINUED | OUTPATIENT
Start: 2018-10-16 | End: 2018-10-20

## 2018-10-16 RX ORDER — METOPROLOL TARTRATE 50 MG
50 TABLET ORAL
Qty: 0 | Refills: 0 | Status: DISCONTINUED | OUTPATIENT
Start: 2018-10-16 | End: 2018-10-20

## 2018-10-16 RX ORDER — ACETAMINOPHEN 500 MG
650 TABLET ORAL EVERY 6 HOURS
Qty: 0 | Refills: 0 | Status: DISCONTINUED | OUTPATIENT
Start: 2018-10-16 | End: 2018-10-17

## 2018-10-16 RX ORDER — OXYCODONE HYDROCHLORIDE 5 MG/1
5 TABLET ORAL EVERY 4 HOURS
Qty: 0 | Refills: 0 | Status: DISCONTINUED | OUTPATIENT
Start: 2018-10-16 | End: 2018-10-20

## 2018-10-16 RX ORDER — ACETAMINOPHEN 500 MG
1000 TABLET ORAL ONCE
Qty: 0 | Refills: 0 | Status: COMPLETED | OUTPATIENT
Start: 2018-10-16 | End: 2018-10-16

## 2018-10-16 RX ORDER — SODIUM CHLORIDE 9 MG/ML
1000 INJECTION, SOLUTION INTRAVENOUS
Qty: 0 | Refills: 0 | Status: DISCONTINUED | OUTPATIENT
Start: 2018-10-16 | End: 2018-10-16

## 2018-10-16 RX ORDER — HYDROMORPHONE HYDROCHLORIDE 2 MG/ML
1 INJECTION INTRAMUSCULAR; INTRAVENOUS; SUBCUTANEOUS
Qty: 0 | Refills: 0 | Status: DISCONTINUED | OUTPATIENT
Start: 2018-10-16 | End: 2018-10-16

## 2018-10-16 RX ADMIN — TAMSULOSIN HYDROCHLORIDE 0.4 MILLIGRAM(S): 0.4 CAPSULE ORAL at 23:14

## 2018-10-16 RX ADMIN — Medication 100 MILLIGRAM(S): at 18:44

## 2018-10-16 RX ADMIN — Medication 100 MILLIGRAM(S): at 23:14

## 2018-10-16 RX ADMIN — Medication 100 MILLIGRAM(S): at 20:28

## 2018-10-16 RX ADMIN — Medication 650 MILLIGRAM(S): at 05:23

## 2018-10-16 RX ADMIN — OXYCODONE HYDROCHLORIDE 10 MILLIGRAM(S): 5 TABLET ORAL at 21:46

## 2018-10-16 RX ADMIN — Medication 75 MICROGRAM(S): at 05:23

## 2018-10-16 RX ADMIN — Medication 400 MILLIGRAM(S): at 20:26

## 2018-10-16 RX ADMIN — Medication 1 TABLET(S): at 20:29

## 2018-10-16 RX ADMIN — Medication 500 MILLIGRAM(S): at 18:43

## 2018-10-16 RX ADMIN — LOSARTAN POTASSIUM 50 MILLIGRAM(S): 100 TABLET, FILM COATED ORAL at 05:23

## 2018-10-16 RX ADMIN — OXYCODONE HYDROCHLORIDE 10 MILLIGRAM(S): 5 TABLET ORAL at 22:46

## 2018-10-16 RX ADMIN — Medication 1 MILLIGRAM(S): at 20:28

## 2018-10-16 RX ADMIN — Medication 50 MILLIGRAM(S): at 18:45

## 2018-10-16 RX ADMIN — HYDROMORPHONE HYDROCHLORIDE 0.5 MILLIGRAM(S): 2 INJECTION INTRAMUSCULAR; INTRAVENOUS; SUBCUTANEOUS at 00:21

## 2018-10-16 RX ADMIN — Medication 50 MILLIGRAM(S): at 05:23

## 2018-10-16 RX ADMIN — Medication 325 MILLIGRAM(S): at 20:28

## 2018-10-16 RX ADMIN — SODIUM CHLORIDE 80 MILLILITER(S): 9 INJECTION, SOLUTION INTRAVENOUS at 00:00

## 2018-10-16 RX ADMIN — Medication 1000 MILLIGRAM(S): at 20:50

## 2018-10-16 RX ADMIN — Medication 325 MILLIGRAM(S): at 18:44

## 2018-10-16 NOTE — DISCHARGE NOTE ADULT - PLAN OF CARE
heal fracture, improve pain, ambulation and ADLs Ortho surgery recommendations:  Patient is to follow-up with Dr. La in office for re-check in 2 weeks - Patient is to call office for appointment.  Patient is to remain weight bearing as tolerated IN KNEE IMMOBILIZER at all times right lower extremity  Patient is to continue Lovenox 40 mg subcutaneously x 4 weeks postop for DVT prophylaxis - last dose 11/13/18.  Patient is to take pain medication as prescribed as needed  Patient is to keep dressing right knee clean and dry at all times. If dressing becomes soiled or wet, patient is to replace dressing with new clean dry gauze dressing. If bleeding or drainage occurs patient is to change dressing and call office immediately for further instruction.  NO BATH OR SOAK. Patient may shower 10/20/18.  The patient is recommended to elevated the affected extremity to reduce swelling. If the splint/cast  becomes too tight, the patient is to immediately elevate and contact the office to discuss further management or immediately proceed to the office if unable to make contact with the office. Ortho surgery recommendations:  *Patient is to follow-up with Dr. La in office for re-check in 2 weeks - Patient is to call office for appointment.  *Patient is to remain weight bearing as tolerated IN KNEE IMMOBILIZER at all times right lower extremity  *Patient is to continue Ecotrin Aspirin 325mg twice daily by mouth for 4 weeks postop for DVT prophylaxis - last dose 11/14/18.  *Patient is to take pain medication as prescribed as needed  *Patient is to keep dressing right knee clean and dry at all times. If dressing becomes soiled or wet, patient is to replace dressing with new clean dry gauze dressing. If bleeding or drainage occurs patient is to change dressing and call office immediately for further instruction.  *NO BATH OR SOAK. Patient may shower 10/20/18.  *The patient is recommended to elevated the affected extremity to reduce swelling. If the splint/cast  becomes too tight, the patient is to immediately elevate and contact the office to discuss further management or immediately proceed to the office if unable to make contact with the office. Please place bacitracin on wound daily to help with wound healing. Please place bacitracin on wound daily to help with wound healing. ** YOU MUST FOLLOW-UP WITH YOUR PRIMARY CARE PROVIDER MONDAY to re-assess if facial sutures are ready to be removed. Your laceration was not yet healed enough to safely remove them prior to discharge ***

## 2018-10-16 NOTE — DISCHARGE NOTE ADULT - CARE PLAN
Principal Discharge DX:	Patella fracture  Goal:	heal fracture, improve pain, ambulation and ADLs  Assessment and plan of treatment:	Ortho surgery recommendations:  Patient is to follow-up with Dr. La in office for re-check in 2 weeks - Patient is to call office for appointment.  Patient is to remain weight bearing as tolerated IN KNEE IMMOBILIZER at all times right lower extremity  Patient is to continue Lovenox 40 mg subcutaneously x 4 weeks postop for DVT prophylaxis - last dose 11/13/18.  Patient is to take pain medication as prescribed as needed  Patient is to keep dressing right knee clean and dry at all times. If dressing becomes soiled or wet, patient is to replace dressing with new clean dry gauze dressing. If bleeding or drainage occurs patient is to change dressing and call office immediately for further instruction.  NO BATH OR SOAK. Patient may shower 10/20/18.  The patient is recommended to elevated the affected extremity to reduce swelling. If the splint/cast  becomes too tight, the patient is to immediately elevate and contact the office to discuss further management or immediately proceed to the office if unable to make contact with the office. Principal Discharge DX:	Patella fracture  Goal:	heal fracture, improve pain, ambulation and ADLs  Assessment and plan of treatment:	Ortho surgery recommendations:  *Patient is to follow-up with Dr. La in office for re-check in 2 weeks - Patient is to call office for appointment.  *Patient is to remain weight bearing as tolerated IN KNEE IMMOBILIZER at all times right lower extremity  *Patient is to continue Ecotrin Aspirin 325mg twice daily by mouth for 4 weeks postop for DVT prophylaxis - last dose 11/14/18.  *Patient is to take pain medication as prescribed as needed  *Patient is to keep dressing right knee clean and dry at all times. If dressing becomes soiled or wet, patient is to replace dressing with new clean dry gauze dressing. If bleeding or drainage occurs patient is to change dressing and call office immediately for further instruction.  *NO BATH OR SOAK. Patient may shower 10/20/18.  *The patient is recommended to elevated the affected extremity to reduce swelling. If the splint/cast  becomes too tight, the patient is to immediately elevate and contact the office to discuss further management or immediately proceed to the office if unable to make contact with the office. Principal Discharge DX:	Patella fracture  Goal:	heal fracture, improve pain, ambulation and ADLs  Assessment and plan of treatment:	Ortho surgery recommendations:  *Patient is to follow-up with Dr. La in office for re-check in 2 weeks - Patient is to call office for appointment.  *Patient is to remain weight bearing as tolerated IN KNEE IMMOBILIZER at all times right lower extremity  *Patient is to continue Ecotrin Aspirin 325mg twice daily by mouth for 4 weeks postop for DVT prophylaxis - last dose 11/14/18.  *Patient is to take pain medication as prescribed as needed  *Patient is to keep dressing right knee clean and dry at all times. If dressing becomes soiled or wet, patient is to replace dressing with new clean dry gauze dressing. If bleeding or drainage occurs patient is to change dressing and call office immediately for further instruction.  *NO BATH OR SOAK. Patient may shower 10/20/18.  *The patient is recommended to elevated the affected extremity to reduce swelling. If the splint/cast  becomes too tight, the patient is to immediately elevate and contact the office to discuss further management or immediately proceed to the office if unable to make contact with the office.  Secondary Diagnosis:	Laceration of forehead, left, complicated  Assessment and plan of treatment:	Please place bacitracin on wound daily to help with wound healing. Principal Discharge DX:	Patella fracture  Goal:	heal fracture, improve pain, ambulation and ADLs  Assessment and plan of treatment:	Ortho surgery recommendations:  *Patient is to follow-up with Dr. La in office for re-check in 2 weeks - Patient is to call office for appointment.  *Patient is to remain weight bearing as tolerated IN KNEE IMMOBILIZER at all times right lower extremity  *Patient is to continue Ecotrin Aspirin 325mg twice daily by mouth for 4 weeks postop for DVT prophylaxis - last dose 11/14/18.  *Patient is to take pain medication as prescribed as needed  *Patient is to keep dressing right knee clean and dry at all times. If dressing becomes soiled or wet, patient is to replace dressing with new clean dry gauze dressing. If bleeding or drainage occurs patient is to change dressing and call office immediately for further instruction.  *NO BATH OR SOAK. Patient may shower 10/20/18.  *The patient is recommended to elevated the affected extremity to reduce swelling. If the splint/cast  becomes too tight, the patient is to immediately elevate and contact the office to discuss further management or immediately proceed to the office if unable to make contact with the office.  Secondary Diagnosis:	Laceration of forehead, left, complicated  Assessment and plan of treatment:	Please place bacitracin on wound daily to help with wound healing. ** YOU MUST FOLLOW-UP WITH YOUR PRIMARY CARE PROVIDER MONDAY to re-assess if facial sutures are ready to be removed. Your laceration was not yet healed enough to safely remove them prior to discharge ***

## 2018-10-16 NOTE — CHART NOTE - NSCHARTNOTEFT_GEN_A_CORE
Post op check    s/p ORIF of R patella. Patient doing fine.  clear urine, since being on the floor. States that he's very thirsty, no nausea or vomiting while drinking fluids. Has not yet tried to eat a regular diet. Also complaining of difficulty initiating urinating, but denies any burning sensation. HDS. VSS, Afebrile. Denies f/c/n/v/cp/sob    PE:   gen: wdwn, nad, a&ox3  cv: rrr  resp: nonlabored breathing  gi: soft, nd, nttp  msk: R knee in knee immobilizer with dressing intact - c/d/i.   vasc: 2+ palpable DP/PT    A/P:  68yoM, s/p R patellar ORIF 2/2 falling off from a ladder. No c/o of pain. HDS. VSS. Only issue is difficulty initiating urinating  - post void residual bladder scan x2 to make sure he's not retaining any urine  - WBAT RLE  - RLE in knee immbolizer at all times  - pain control PRN  - PT/OOB  - F/u with Dr. La in 2 weeks

## 2018-10-16 NOTE — DISCHARGE NOTE ADULT - PATIENT PORTAL LINK FT
You can access the [facility] Patient Portal, offered by [facility], by registering with the following website: [email or web address]

## 2018-10-16 NOTE — PRE-OP CHECKLIST - 1.
Patient arrived in OR holding area alert and orientated x4. VSS. Patient refused  services, when offered. Safety maintained.

## 2018-10-16 NOTE — DISCHARGE NOTE ADULT - HOSPITAL COURSE
HPI: Icelandic speaking 68 year old man BIBMES to [facility] after sustaining a 10 ft fall from a ladder while pruning trees at home. Patient denies LOC, GCS 15. Presented to trauma bay with a 3 cm right forehead laceration and a RLE external splint for immobilization due to a RLE deformity. Patient was no in acute distress and hemodynamically stable.  CT Head negative for acute traumatic injury.  CT Cspine mod to severe disc space disease at C3-4.  XR Rt knee:  Displaced patella fx.  Pt went to the OR with Ortho on 10/16 for ORIF Rt patella.  Pt worked with PT post op, recs for d/c home.  Post op pt had episode of urinary retention requiring replacement of armstrong catheter.  Pt started on FLomax, given 2nd TOV after 48hours on FLomax.  ......... HPI: Luxembourgish speaking 68 year old man BIBMES to [facility] after sustaining a 10 ft fall from a ladder while pruning trees at home. Patient denies LOC, GCS 15. Presented to trauma bay with a 3 cm right forehead laceration and a RLE external splint for immobilization due to a RLE deformity. Patient was no in acute distress and hemodynamically stable.  Laceration repaired @ bedside. CT Head negative for acute traumatic injury.  CT Cspine mod to severe disc space disease at C3-4.  XR Rt knee:  Displaced patella fx.  Pt went to the OR with Ortho on 10/16 for ORIF Rt patella.  Pt worked with PT post op, recs for d/c home.  Post op pt had episode of urinary retention requiring replacement of armstrong catheter.  Pt started on flomax, given 2nd TOV after 48hours on Flomax & passed. Voiding independently with no difficulty. At time of discharge pt remains hemodynamically well, tolerating diet, pain well controlled, OOB ambulating, having bowel function & voiding.     Patient is advised to RETURN TO THE EMERGENCY DEPARTMENT for any of the following - worsening pain, fever/chills, nausea/vomiting, altered mental status, chest pain, shortness of breath, or any other new / worsening symptom. HPI: Italian speaking 68 year old man BIBMES to [facility] after sustaining a 10 ft fall from a ladder while pruning trees at home. Patient denies LOC, GCS 15. Presented to trauma bay with a 3 cm right forehead laceration and a RLE external splint for immobilization due to a RLE deformity. Patient was no in acute distress and hemodynamically stable.  Laceration repaired @ bedside. CT Head negative for acute traumatic injury.  CT Cspine mod to severe disc space disease at C3-4.  XR Rt knee:  Displaced patella fx.  Pt went to the OR with Ortho on 10/16 for ORIF Rt patella.  Pt worked with PT post op, recs for d/c home.  Post op pt had episode of urinary retention requiring replacement of armstrong catheter.  Pt started on flomax, given 2nd TOV after 48hours on Flomax & passed. Voiding independently with no difficulty. Spoke with patient and his son regarding need to f/u with PCP soon after discharge so facial sutures can be reassessed / removed. Both expressed understanding of plan and are agreeable. At time of discharge pt remains hemodynamically well, tolerating diet, pain well controlled, OOB ambulating, having bowel function & voiding.     Patient is advised to RETURN TO THE EMERGENCY DEPARTMENT for any of the following - worsening pain, fever/chills, nausea/vomiting, altered mental status, chest pain, shortness of breath, or any other new / worsening symptom.

## 2018-10-16 NOTE — DISCHARGE NOTE ADULT - MEDICATION SUMMARY - MEDICATIONS TO TAKE
I will START or STAY ON the medications listed below when I get home from the hospital:    ibuprofen 600 mg oral tablet  -- 1 tab(s) by mouth every 6 hours, As needed, Mild Pain (1 - 3)  -- Indication: For Pain    oxyCODONE 5 mg oral tablet  -- 1 tab(s) by mouth every 6 hours, As Needed  for severe pain MDD:4  -- Indication: For Pain    aspirin 325 mg oral tablet  -- 1 tab(s) by mouth 2 times a day   -- Take with food or milk.    -- Indication: For DVT prophylaxis    losartan 50 mg oral tablet  -- 1 tab(s) by mouth once a day  -- Indication: For Home med    tamsulosin 0.4 mg oral capsule  -- 1 cap(s) by mouth once a day (at bedtime)  -- Indication: For Urinary retention    metoprolol tartrate 50 mg oral tablet  -- 1 tab(s) by mouth 2 times a day  -- Indication: For Home med    docusate sodium 100 mg oral capsule  -- 1 cap(s) by mouth 3 times a day  -- Indication: For constipation    senna oral tablet  -- 2 tab(s) by mouth once a day (at bedtime)  -- Indication: For constipation    levothyroxine 75 mcg (0.075 mg) oral tablet  -- 1 tab(s) by mouth once a day  -- Indication: For Home med    Multiple Vitamins oral tablet  -- 1 tab(s) by mouth once a day  -- Indication: For wound healing    ascorbic acid 500 mg oral tablet  -- 1 tab(s) by mouth 2 times a day  -- Indication: For wound healing    folic acid 1 mg oral tablet  -- 1 tab(s) by mouth once a day  -- Indication: For wound healing I will START or STAY ON the medications listed below when I get home from the hospital:    ibuprofen 600 mg oral tablet  -- 1 tab(s) by mouth every 6 hours, As needed, Mild Pain (1 - 3)  -- Indication: For Pain    oxyCODONE 5 mg oral tablet  -- 1 tab(s) by mouth every 6 hours, As Needed  for severe pain MDD:4  -- Indication: For Pain    aspirin 325 mg oral tablet  -- 1 tab(s) by mouth 2 times a day   -- Take with food or milk.    -- Indication: For DVT prophylaxis    losartan 50 mg oral tablet  -- 1 tab(s) by mouth once a day  -- Indication: For Home med    tamsulosin 0.4 mg oral capsule  -- 1 cap(s) by mouth once a day (at bedtime)  -- Indication: For Urinary retention    metoprolol tartrate 50 mg oral tablet  -- 1 tab(s) by mouth 2 times a day  -- Indication: For Home med    bacitracin 500 units/g topical ointment  -- 1 application on skin once a day  -- Indication: For Wound care    docusate sodium 100 mg oral capsule  -- 1 cap(s) by mouth 3 times a day  -- Indication: For constipation    senna oral tablet  -- 2 tab(s) by mouth once a day (at bedtime)  -- Indication: For constipation    levothyroxine 75 mcg (0.075 mg) oral tablet  -- 1 tab(s) by mouth once a day  -- Indication: For Home med    Multiple Vitamins oral tablet  -- 1 tab(s) by mouth once a day  -- Indication: For wound healing    ascorbic acid 500 mg oral tablet  -- 1 tab(s) by mouth 2 times a day  -- Indication: For wound healing    folic acid 1 mg oral tablet  -- 1 tab(s) by mouth once a day  -- Indication: For wound healing I will START or STAY ON the medications listed below when I get home from the hospital:    ibuprofen 600 mg oral tablet  -- 1 tab(s) by mouth every 6 hours, As needed, Mild Pain (1 - 3)  -- Indication: For Pain    oxyCODONE 5 mg oral tablet  -- 1 tab(s) by mouth every 6 hours, As Needed  for severe pain MDD:4  -- Indication: For Pain    aspirin 325 mg oral tablet  -- 1 tab(s) by mouth 2 times a day   -- Take with food or milk.    -- Indication: For DVT prophylaxis    losartan 50 mg oral tablet  -- 1 tab(s) by mouth once a day  -- Indication: For Home med    tamsulosin 0.4 mg oral capsule  -- 1 cap(s) by mouth once a day (at bedtime)  -- Indication: For Urinary retention    metoprolol tartrate 50 mg oral tablet  -- 1 tab(s) by mouth 2 times a day  -- Indication: For Home med    bacitracin 500 units/g topical ointment  -- 1 application on skin once a day  -- Indication: For wound care    docusate sodium 100 mg oral capsule  -- 1 cap(s) by mouth 3 times a day  -- Indication: For constipation    senna oral tablet  -- 2 tab(s) by mouth once a day (at bedtime)  -- Indication: For constipation    levothyroxine 75 mcg (0.075 mg) oral tablet  -- 1 tab(s) by mouth once a day  -- Indication: For Home med    Multiple Vitamins oral tablet  -- 1 tab(s) by mouth once a day  -- Indication: For wound healing    ascorbic acid 500 mg oral tablet  -- 1 tab(s) by mouth 2 times a day  -- Indication: For wound healing    folic acid 1 mg oral tablet  -- 1 tab(s) by mouth once a day  -- Indication: For wound healing

## 2018-10-16 NOTE — PATIENT PROFILE ADULT - NSPROMEDSBROUGHTTOHOSP_GEN_A_NUR
Ambulatory/Rehab Services H2 Model Falls Risk Assessment    Risk Factor Pts. ·   Confusion/Disorientation/Impulsivity  []    4 ·   Symptomatic Depression  []   2 ·   Altered Elimination  []   1 ·   Dizziness/Vertigo  []   1 ·   Gender (Male)  []   1 ·   Any administered antiepileptics (anticonvulsants):  []   2 ·   Any administered benzodiazepines:  []   1 ·   Visual Impairment (specify):  []   1 ·   Portable Oxygen Use  []   1 ·   Orthostatic ? BP  []   1 ·   History of Recent Falls (within 3 mos.)  []   5     Ability to Rise from Chair (choose one) Pts. ·   Ability to rise in a single movement  [x]   0 ·   Pushes up, successful in one attempt  []   1 ·   Multiple attempts, but successful  []   3 ·   Unable to rise without assistance  []   4   Total: (5 or greater = High Risk) 0     Falls Prevention Plan:   []                Physical Limitations to Exercise (specify):   []                Mobility Assistance Device (type):   []                Exercise/Equipment Adaptation (specify):    ©2010 [facility] [address] Patent #[id number].  Federal Law prohibits the replication, distribution or use without written permission from [facility] [employer] no

## 2018-10-16 NOTE — BRIEF OPERATIVE NOTE - PROCEDURE
<<-----Click on this checkbox to enter Procedure Open reduction and internal fixation (ORIF) of fracture of patella  10/16/2018  ORIF right patella fracture  Active  TMULRY

## 2018-10-16 NOTE — DISCHARGE NOTE ADULT - ADDITIONAL INSTRUCTIONS
ORTHOPEDIC FOLLOW UP RECOMMENDATIONS: The patient will be seen in the office between 1-2 weeks for splint removal and wound check. Sutures/Staples will be removed at that time. Patient may NOT shower until after re-evaluation in the office. The patient will continue with splint as applied in hospital and not remove until re-evaluation in the office. The patient will contact the office if the wound becomes red, has increasing pain, develops bleeding or discharge, an injury occurs, or has other concerns. The patient will continue LOVENOX for 4 weeks for DVTP. The patient will take oxycodone & tylenol for pain control and titrate according to prescription and patient needs. The patient is weight bearing as tolerated with use of knee immobilizer at all times on the right lower extremity. The patient is recommended to elevated the affected extremity to reduce swelling. If the splint/cast  becomes too tight, the patient is to immediately elevate and contact the office to discuss further management or immediately proceed to the office if unable to make contact with the office. Ortho surgery recommendations:  Patient is to follow-up with Dr. La in office for re-check in 2 weeks - Patient is to call office for appointment.  Patient is to remain weight bearing as tolerated IN KNEE IMMOBILIZER at all times right lower extremity  Patient is to continue Lovenox 40 mg subcutaneously every 24 hrs x 4 weeks postop for DVT prophylaxis - last dose 11/13/18.  Patient is to take pain medication as prescribed as needed  Patient is to keep dressing right knee clean and dry at all times. If dressing becomes soiled or wet, patient is to replace dressing with new clean dry gauze dressing. If bleeding or drainage occurs patient is to change dressing and call office immediately for further instruction.  NO BATH OR SOAK. Patient may shower 10/20/18.  The patient is recommended to elevated the affected extremity to reduce swelling. If the splint/cast  becomes too tight, the patient is to immediately elevate and contact the office to discuss further management or immediately proceed to the office if unable to make contact with the office. Ortho surgery recommendations:  *Patient is to follow-up with Dr. La in office for re-check in 2 weeks - Patient is to call office for appointment.  *Patient is to remain weight bearing as tolerated IN KNEE IMMOBILIZER at all times right lower extremity  *Patient is to continue Ecotrin Aspirin 325mg twice daily by mouth for 4 weeks postop for DVT prophylaxis - last dose 11/14/18.  *Patient is to take pain medication as prescribed as needed  *Patient is to keep dressing right knee clean and dry at all times. If dressing becomes soiled or wet, patient is to replace dressing with new clean dry gauze dressing. If bleeding or drainage occurs patient is to change dressing and call office immediately for further instruction.  *NO BATH OR SOAK. Patient may shower 10/20/18.  *The patient is recommended to elevated the affected extremity to reduce swelling. If the splint/cast  becomes too tight, the patient is to immediately elevate and contact the office to discuss further management or immediately proceed to the office if unable to make contact with the office.

## 2018-10-16 NOTE — BRIEF OPERATIVE NOTE - COMMENTS
Post-Op Plan  1. Pain control  2. WBAT RLE in Knee Immobilizer at all times  3. PT/OOB  4. Lovenox  5. FU labs  6. FU with Dr. La in 2 weeks

## 2018-10-17 DIAGNOSIS — S82.009A UNSPECIFIED FRACTURE OF UNSPECIFIED PATELLA, INITIAL ENCOUNTER FOR CLOSED FRACTURE: ICD-10-CM

## 2018-10-17 DIAGNOSIS — S01.81XA LACERATION WITHOUT FOREIGN BODY OF OTHER PART OF HEAD, INITIAL ENCOUNTER: ICD-10-CM

## 2018-10-17 DIAGNOSIS — R33.9 RETENTION OF URINE, UNSPECIFIED: ICD-10-CM

## 2018-10-17 LAB
ANION GAP SERPL CALC-SCNC: 10 MMOL/L — SIGNIFICANT CHANGE UP (ref 5–17)
BUN SERPL-MCNC: 12 MG/DL — SIGNIFICANT CHANGE UP (ref 8–20)
CALCIUM SERPL-MCNC: 8.9 MG/DL — SIGNIFICANT CHANGE UP (ref 8.6–10.2)
CHLORIDE SERPL-SCNC: 99 MMOL/L — SIGNIFICANT CHANGE UP (ref 98–107)
CO2 SERPL-SCNC: 27 MMOL/L — SIGNIFICANT CHANGE UP (ref 22–29)
CREAT SERPL-MCNC: 0.91 MG/DL — SIGNIFICANT CHANGE UP (ref 0.5–1.3)
GLUCOSE SERPL-MCNC: 121 MG/DL — HIGH (ref 70–115)
HCT VFR BLD CALC: 35.1 % — LOW (ref 42–52)
HGB BLD-MCNC: 11.5 G/DL — LOW (ref 14–18)
MCHC RBC-ENTMCNC: 30.5 PG — SIGNIFICANT CHANGE UP (ref 27–31)
MCHC RBC-ENTMCNC: 32.8 G/DL — SIGNIFICANT CHANGE UP (ref 32–36)
MCV RBC AUTO: 93.1 FL — SIGNIFICANT CHANGE UP (ref 80–94)
PLATELET # BLD AUTO: 195 K/UL — SIGNIFICANT CHANGE UP (ref 150–400)
POTASSIUM SERPL-MCNC: 4.5 MMOL/L — SIGNIFICANT CHANGE UP (ref 3.5–5.3)
POTASSIUM SERPL-SCNC: 4.5 MMOL/L — SIGNIFICANT CHANGE UP (ref 3.5–5.3)
RBC # BLD: 3.77 M/UL — LOW (ref 4.6–6.2)
RBC # FLD: 12.4 % — SIGNIFICANT CHANGE UP (ref 11–15.6)
SODIUM SERPL-SCNC: 136 MMOL/L — SIGNIFICANT CHANGE UP (ref 135–145)
WBC # BLD: 9.9 K/UL — SIGNIFICANT CHANGE UP (ref 4.8–10.8)
WBC # FLD AUTO: 9.9 K/UL — SIGNIFICANT CHANGE UP (ref 4.8–10.8)

## 2018-10-17 RX ORDER — SENNA PLUS 8.6 MG/1
2 TABLET ORAL AT BEDTIME
Qty: 0 | Refills: 0 | Status: DISCONTINUED | OUTPATIENT
Start: 2018-10-17 | End: 2018-10-20

## 2018-10-17 RX ORDER — IBUPROFEN 200 MG
600 TABLET ORAL EVERY 6 HOURS
Qty: 0 | Refills: 0 | Status: DISCONTINUED | OUTPATIENT
Start: 2018-10-17 | End: 2018-10-20

## 2018-10-17 RX ADMIN — LOSARTAN POTASSIUM 50 MILLIGRAM(S): 100 TABLET, FILM COATED ORAL at 05:23

## 2018-10-17 RX ADMIN — Medication 325 MILLIGRAM(S): at 18:11

## 2018-10-17 RX ADMIN — Medication 100 MILLIGRAM(S): at 13:35

## 2018-10-17 RX ADMIN — SENNA PLUS 2 TABLET(S): 8.6 TABLET ORAL at 21:29

## 2018-10-17 RX ADMIN — OXYCODONE HYDROCHLORIDE 10 MILLIGRAM(S): 5 TABLET ORAL at 06:00

## 2018-10-17 RX ADMIN — ENOXAPARIN SODIUM 40 MILLIGRAM(S): 100 INJECTION SUBCUTANEOUS at 01:15

## 2018-10-17 RX ADMIN — Medication 1 TABLET(S): at 13:35

## 2018-10-17 RX ADMIN — Medication 100 MILLIGRAM(S): at 02:21

## 2018-10-17 RX ADMIN — Medication 500 MILLIGRAM(S): at 05:23

## 2018-10-17 RX ADMIN — TAMSULOSIN HYDROCHLORIDE 0.4 MILLIGRAM(S): 0.4 CAPSULE ORAL at 21:30

## 2018-10-17 RX ADMIN — Medication 600 MILLIGRAM(S): at 09:32

## 2018-10-17 RX ADMIN — Medication 100 MILLIGRAM(S): at 21:29

## 2018-10-17 RX ADMIN — OXYCODONE HYDROCHLORIDE 10 MILLIGRAM(S): 5 TABLET ORAL at 05:10

## 2018-10-17 RX ADMIN — Medication 325 MILLIGRAM(S): at 09:49

## 2018-10-17 RX ADMIN — Medication 325 MILLIGRAM(S): at 13:35

## 2018-10-17 RX ADMIN — Medication 500 MILLIGRAM(S): at 18:14

## 2018-10-17 RX ADMIN — Medication 50 MILLIGRAM(S): at 05:23

## 2018-10-17 RX ADMIN — Medication 1 MILLIGRAM(S): at 13:35

## 2018-10-17 RX ADMIN — Medication 75 MICROGRAM(S): at 05:23

## 2018-10-17 RX ADMIN — Medication 600 MILLIGRAM(S): at 09:49

## 2018-10-17 RX ADMIN — Medication 50 MILLIGRAM(S): at 18:11

## 2018-10-17 NOTE — PROGRESS NOTE ADULT - SUBJECTIVE AND OBJECTIVE BOX
HPI/OVERNIGHT EVENTS: Patient seen and examined at bedside. Overnight patient went into urinary retention - catheter replaced by resident without difficulty. Patient currently complaining of pain to RLE for which medications are only giving mild relief. He denies numbness / tingling of affected extremity. Tolerating regular diet.     MEDICATIONS  (STANDING):  ascorbic acid 500 milliGRAM(s) Oral two times a day  docusate sodium 100 milliGRAM(s) Oral three times a day  enoxaparin Injectable 40 milliGRAM(s) SubCutaneous every 24 hours  ferrous    sulfate 325 milliGRAM(s) Oral three times a day with meals  folic acid 1 milliGRAM(s) Oral daily  levothyroxine 75 MICROGram(s) Oral daily  losartan 50 milliGRAM(s) Oral daily  metoprolol tartrate 50 milliGRAM(s) Oral two times a day  multivitamin 1 Tablet(s) Oral daily  tamsulosin 0.4 milliGRAM(s) Oral at bedtime    MEDICATIONS  (PRN):  acetaminophen   Tablet .. 650 milliGRAM(s) Oral every 6 hours PRN Temp greater or equal to 38C (100.4F)  acetaminophen   Tablet .. 975 milliGRAM(s) Oral every 8 hours PRN Mild Pain (1 - 3)  ondansetron Injectable 4 milliGRAM(s) IV Push every 6 hours PRN Nausea and/or Vomiting  oxyCODONE    IR 10 milliGRAM(s) Oral every 4 hours PRN Severe Pain (7 - 10)  oxyCODONE    IR 5 milliGRAM(s) Oral every 4 hours PRN Moderate Pain (4 - 6)      Vital Signs Last 24 Hrs  T(C): 36.9 (17 Oct 2018 04:43), Max: 36.9 (16 Oct 2018 17:29)  T(F): 98.4 (17 Oct 2018 04:43), Max: 98.5 (16 Oct 2018 17:29)  HR: 70 (17 Oct 2018 04:43) (65 - 90)  BP: 136/74 (17 Oct 2018 04:43) (120/79 - 139/78)  BP(mean): --  RR: 18 (17 Oct 2018 04:43) (11 - 18)  SpO2: 95% (17 Oct 2018 04:43) (93% - 99%)    Constitutional: patient resting comfortably in bed, in no acute distress  HEENT: EOMI / PERRL b/l, no active drainage or redness  Neck: No JVD, full ROM without pain  Respiratory: respirations are unlabored, no accessory muscle use, no conversational dyspnea  Cardiovascular: regular rate & rhythm  Gastrointestinal: Abdomen soft, non-tender, non-distended, no rebound tenderness / guarding  Neurological: GCS: 15 (4/5/6). A&O x 3; no gross sensory / motor / coordination deficits  Psychiatric: Normal mood, normal affect  Musculoskeletal: No joint pain, swelling or deformity; no limitation of movement      I&O's Detail    16 Oct 2018 07:01  -  17 Oct 2018 07:00  --------------------------------------------------------  IN:    Oral Fluid: 300 mL    sodium chloride 0.9%: 1125 mL  Total IN: 1425 mL    OUT:    Indwelling Catheter - Urethral: 1500 mL    Voided: 2170 mL  Total OUT: 3670 mL    Total NET: -2245 mL          LABS:                        11.5   9.9   )-----------( 195      ( 17 Oct 2018 06:56 )             35.1     10-    136  |  99  |  12.0  ----------------------------<  121<H>  4.5   |  27.0  |  0.91    Ca    8.9      17 Oct 2018 06:56    TPro  7.1  /  Alb  4.2  /  TBili  0.3<L>  /  DBili  x   /  AST  17  /  ALT  16  /  AlkPhos  68  10-15    PT/INR - ( 15 Oct 2018 15:49 )   PT: 10.6 sec;   INR: 0.96 ratio         PTT - ( 15 Oct 2018 15:49 )  PTT:21.8 sec  Urinalysis Basic - ( 15 Oct 2018 21:28 )    Color: Yellow / Appearance: Clear / S.015 / pH: x  Gluc: x / Ketone: Negative  / Bili: Negative / Urobili: Negative mg/dL   Blood: x / Protein: Negative mg/dL / Nitrite: Negative   Leuk Esterase: Negative / RBC: x / WBC x   Sq Epi: x / Non Sq Epi: x / Bacteria: x HPI/OVERNIGHT EVENTS: Patient seen and examined at bedside. Overnight patient went into urinary retention - catheter replaced by resident physician without difficulty. Patient currently complaining of pain to RLE for which medications are only giving mild relief. He denies numbness / tingling of affected extremity. Tolerating regular diet. Passing flatus. Denies fever, chills, CP, or SOB at this time.    MEDICATIONS  (STANDING):  ascorbic acid 500 milliGRAM(s) Oral two times a day  docusate sodium 100 milliGRAM(s) Oral three times a day  enoxaparin Injectable 40 milliGRAM(s) SubCutaneous every 24 hours  ferrous    sulfate 325 milliGRAM(s) Oral three times a day with meals  folic acid 1 milliGRAM(s) Oral daily  levothyroxine 75 MICROGram(s) Oral daily  losartan 50 milliGRAM(s) Oral daily  metoprolol tartrate 50 milliGRAM(s) Oral two times a day  multivitamin 1 Tablet(s) Oral daily  tamsulosin 0.4 milliGRAM(s) Oral at bedtime    MEDICATIONS  (PRN):  acetaminophen   Tablet .. 650 milliGRAM(s) Oral every 6 hours PRN Temp greater or equal to 38C (100.4F)  acetaminophen   Tablet .. 975 milliGRAM(s) Oral every 8 hours PRN Mild Pain (1 - 3)  ondansetron Injectable 4 milliGRAM(s) IV Push every 6 hours PRN Nausea and/or Vomiting  oxyCODONE    IR 10 milliGRAM(s) Oral every 4 hours PRN Severe Pain (7 - 10)  oxyCODONE    IR 5 milliGRAM(s) Oral every 4 hours PRN Moderate Pain (4 - 6)      Vital Signs Last 24 Hrs  T(C): 36.9 (17 Oct 2018 04:43), Max: 36.9 (16 Oct 2018 17:29)  T(F): 98.4 (17 Oct 2018 04:43), Max: 98.5 (16 Oct 2018 17:29)  HR: 70 (17 Oct 2018 04:43) (65 - 90)  BP: 136/74 (17 Oct 2018 04:43) (120/79 - 139/78)  BP(mean): --  RR: 18 (17 Oct 2018 04:43) (11 - 18)  SpO2: 95% (17 Oct 2018 04:43) (93% - 99%)    Constitutional: patient resting comfortably in bed, easily awoken from sleep, in no acute distress  HEENT: forehead lac with overlying dressing clean & intact, EOMI / PERRL b/l  Respiratory: respirations are unlabored, no accessory muscle use, no conversational dyspnea  Cardiovascular: regular rate & rhythm  Gastrointestinal: abdomen soft, non-tender, non-distended, no rebound tenderness / guarding  Neurological: GCS: 15 (4/5/6). A&O x 3  Musculoskeletal: RLE with overlying ACE wrap C/D/I, KI in place, skin is warm, compartments soft, 2+ DP pulse, distal sensation & motor fxn grossly intact    I&O's Detail    16 Oct 2018 07:01  -  17 Oct 2018 07:00  --------------------------------------------------------  IN:    Oral Fluid: 300 mL    sodium chloride 0.9%: 1125 mL  Total IN: 1425 mL    OUT:    Indwelling Catheter - Urethral: 1500 mL    Voided: 2170 mL  Total OUT: 3670 mL    Total NET: -2245 mL          LABS:                        11.5   9.9   )-----------( 195      ( 17 Oct 2018 06:56 )             35.1     10-    136  |  99  |  12.0  ----------------------------<  121<H>  4.5   |  27.0  |  0.91    Ca    8.9      17 Oct 2018 06:56    TPro  7.1  /  Alb  4.2  /  TBili  0.3<L>  /  DBili  x   /  AST  17  /  ALT  16  /  AlkPhos  68  10-15    PT/INR - ( 15 Oct 2018 15:49 )   PT: 10.6 sec;   INR: 0.96 ratio         PTT - ( 15 Oct 2018 15:49 )  PTT:21.8 sec  Urinalysis Basic - ( 15 Oct 2018 21:28 )    Color: Yellow / Appearance: Clear / S.015 / pH: x  Gluc: x / Ketone: Negative  / Bili: Negative / Urobili: Negative mg/dL   Blood: x / Protein: Negative mg/dL / Nitrite: Negative   Leuk Esterase: Negative / RBC: x / WBC x   Sq Epi: x / Non Sq Epi: x / Bacteria: x

## 2018-10-17 NOTE — OCCUPATIONAL THERAPY INITIAL EVALUATION ADULT - ASSISTIVE DEVICE FOR TOILET TRANSFER, REHAB EVAL
right grab bar; pt with benefit from use of commode over toilet at home to increase surface height and provide armrests/rolling walker

## 2018-10-17 NOTE — PHYSICAL THERAPY INITIAL EVALUATION ADULT - ACTIVE RANGE OF MOTION EXAMINATION, REHAB EVAL
bilateral upper extremity Active ROM was WFL (within functional limits)/Left LE Active ROM was WFL (within functional limits)/right hip WFL

## 2018-10-17 NOTE — OCCUPATIONAL THERAPY INITIAL EVALUATION ADULT - GENERAL OBSERVATIONS, REHAB EVAL
Received pt in semi-salvador position in bed, +IV lock, +RLE knee immobilizer, +armstrong; pt agrees to OT.

## 2018-10-17 NOTE — OCCUPATIONAL THERAPY INITIAL EVALUATION ADULT - PERTINENT HX OF CURRENT PROBLEM, REHAB EVAL
Pt present to ED with c/o falling off of a ladder while cutting trees. X-ray reveals displaced patellar fracture.

## 2018-10-17 NOTE — OCCUPATIONAL THERAPY INITIAL EVALUATION ADULT - ADDITIONAL COMMENTS
Pt lives in house with 7 platform steps to enter and no steps inside; bedroom and bathroom are on main level. Bathroom has shower stall with door. Pt owns shower chair. Pt is right handed. Pt drives. Pt's wife is hemiplegic and wheelchair bound since 2004; pt assists wife with all ADLs/transfers. Pt's son is currently with wife assisting with care while pt is in hospital.

## 2018-10-17 NOTE — PHYSICAL THERAPY INITIAL EVALUATION ADULT - ADDITIONAL COMMENTS
Pt. reports living in a house with 7 platform steps to enter / bilateral HR and 0 STI with his wife who is disabled. Pt. is primary caregiver to his wife. Pt. owns a shower chair and will need a RW to go home with. +driving PTA. Son is currently home with pt wife.

## 2018-10-17 NOTE — PROGRESS NOTE ADULT - SUBJECTIVE AND OBJECTIVE BOX
History:  Patient seen and eval at bedside with hospital . Patient c/o pain at operative site overnight but states the pain medication helped significantly. Patient denies nausea, vomiting, chest pain, shortness of breath, abdominal pain or fever.     Vital Signs Last 24 Hrs  T(C): 36.9 (17 Oct 2018 04:43), Max: 36.9 (16 Oct 2018 17:29)  T(F): 98.4 (17 Oct 2018 04:43), Max: 98.5 (16 Oct 2018 17:29)  HR: 70 (17 Oct 2018 04:43) (61 - 90)  BP: 136/74 (17 Oct 2018 04:43) (106/61 - 139/78)  RR: 18 (17 Oct 2018 04:43) (11 - 18)  SpO2: 95% (17 Oct 2018 04:43) (93% - 99%)    labs pending             Physical exam: NAD, awake, alert  Right Lower extremity: Knee immobilizer in place, The dressing is clean, dry and intact. No discharge, drainage is noted. EHL/TA/GS/FHL intact,  Sensation to light touch is grossly intact distally s/s/DP/SP/tib. No foot drop. 2+ dorsalis pedis pulse. Calf soft, NT B/L. No cyanosis.    Primary Orthopedic Assessment: 69 yo M s/p right patella ORIF POD#1    Plan:   ·	DVT prophylaxis as prescribed - Lov, including use of compression devices and ankle pumps  ·	Continue physical therapy: WBAT in knee immobilizer at all times  ·	Pain control as clinically indicated  ·	F/u labs  ·	Incentive spirometry encouraged  ·	Cont care as per primary team

## 2018-10-18 LAB
ANION GAP SERPL CALC-SCNC: 14 MMOL/L — SIGNIFICANT CHANGE UP (ref 5–17)
BUN SERPL-MCNC: 14 MG/DL — SIGNIFICANT CHANGE UP (ref 8–20)
CALCIUM SERPL-MCNC: 8.9 MG/DL — SIGNIFICANT CHANGE UP (ref 8.6–10.2)
CHLORIDE SERPL-SCNC: 102 MMOL/L — SIGNIFICANT CHANGE UP (ref 98–107)
CO2 SERPL-SCNC: 24 MMOL/L — SIGNIFICANT CHANGE UP (ref 22–29)
CREAT SERPL-MCNC: 0.77 MG/DL — SIGNIFICANT CHANGE UP (ref 0.5–1.3)
GLUCOSE SERPL-MCNC: 114 MG/DL — SIGNIFICANT CHANGE UP (ref 70–115)
HCT VFR BLD CALC: 35.2 % — LOW (ref 42–52)
HGB BLD-MCNC: 11.7 G/DL — LOW (ref 14–18)
MCHC RBC-ENTMCNC: 30.6 PG — SIGNIFICANT CHANGE UP (ref 27–31)
MCHC RBC-ENTMCNC: 33.2 G/DL — SIGNIFICANT CHANGE UP (ref 32–36)
MCV RBC AUTO: 92.1 FL — SIGNIFICANT CHANGE UP (ref 80–94)
PLATELET # BLD AUTO: 192 K/UL — SIGNIFICANT CHANGE UP (ref 150–400)
POTASSIUM SERPL-MCNC: 4.1 MMOL/L — SIGNIFICANT CHANGE UP (ref 3.5–5.3)
POTASSIUM SERPL-SCNC: 4.1 MMOL/L — SIGNIFICANT CHANGE UP (ref 3.5–5.3)
RBC # BLD: 3.82 M/UL — LOW (ref 4.6–6.2)
RBC # FLD: 12.1 % — SIGNIFICANT CHANGE UP (ref 11–15.6)
SODIUM SERPL-SCNC: 140 MMOL/L — SIGNIFICANT CHANGE UP (ref 135–145)
WBC # BLD: 8.4 K/UL — SIGNIFICANT CHANGE UP (ref 4.8–10.8)
WBC # FLD AUTO: 8.4 K/UL — SIGNIFICANT CHANGE UP (ref 4.8–10.8)

## 2018-10-18 RX ORDER — ASPIRIN/CALCIUM CARB/MAGNESIUM 324 MG
1 TABLET ORAL
Qty: 60 | Refills: 0 | OUTPATIENT
Start: 2018-10-18 | End: 2018-11-16

## 2018-10-18 RX ORDER — METOPROLOL TARTRATE 50 MG
1 TABLET ORAL
Qty: 0 | Refills: 0 | COMMUNITY
Start: 2018-10-18

## 2018-10-18 RX ORDER — LOSARTAN POTASSIUM 100 MG/1
1 TABLET, FILM COATED ORAL
Qty: 0 | Refills: 0 | COMMUNITY
Start: 2018-10-18

## 2018-10-18 RX ORDER — FOLIC ACID 0.8 MG
1 TABLET ORAL
Qty: 0 | Refills: 0 | COMMUNITY
Start: 2018-10-18

## 2018-10-18 RX ORDER — SENNA PLUS 8.6 MG/1
2 TABLET ORAL
Qty: 0 | Refills: 0 | COMMUNITY
Start: 2018-10-18

## 2018-10-18 RX ORDER — ASCORBIC ACID 60 MG
1 TABLET,CHEWABLE ORAL
Qty: 0 | Refills: 0 | COMMUNITY
Start: 2018-10-18

## 2018-10-18 RX ORDER — OXYCODONE HYDROCHLORIDE 5 MG/1
1 TABLET ORAL
Qty: 24 | Refills: 0 | OUTPATIENT
Start: 2018-10-18

## 2018-10-18 RX ORDER — DOCUSATE SODIUM 100 MG
1 CAPSULE ORAL
Qty: 0 | Refills: 0 | COMMUNITY
Start: 2018-10-18

## 2018-10-18 RX ORDER — LEVOTHYROXINE SODIUM 125 MCG
1 TABLET ORAL
Qty: 0 | Refills: 0 | COMMUNITY
Start: 2018-10-18

## 2018-10-18 RX ORDER — IBUPROFEN 200 MG
1 TABLET ORAL
Qty: 0 | Refills: 0 | COMMUNITY
Start: 2018-10-18

## 2018-10-18 RX ORDER — TAMSULOSIN HYDROCHLORIDE 0.4 MG/1
1 CAPSULE ORAL
Qty: 14 | Refills: 0 | OUTPATIENT
Start: 2018-10-18

## 2018-10-18 RX ADMIN — Medication 50 MILLIGRAM(S): at 19:00

## 2018-10-18 RX ADMIN — Medication 600 MILLIGRAM(S): at 09:56

## 2018-10-18 RX ADMIN — LOSARTAN POTASSIUM 50 MILLIGRAM(S): 100 TABLET, FILM COATED ORAL at 05:52

## 2018-10-18 RX ADMIN — Medication 325 MILLIGRAM(S): at 09:48

## 2018-10-18 RX ADMIN — Medication 500 MILLIGRAM(S): at 19:00

## 2018-10-18 RX ADMIN — Medication 1 TABLET(S): at 15:43

## 2018-10-18 RX ADMIN — Medication 600 MILLIGRAM(S): at 15:02

## 2018-10-18 RX ADMIN — Medication 500 MILLIGRAM(S): at 05:52

## 2018-10-18 RX ADMIN — Medication 100 MILLIGRAM(S): at 15:43

## 2018-10-18 RX ADMIN — Medication 1 MILLIGRAM(S): at 15:43

## 2018-10-18 RX ADMIN — Medication 325 MILLIGRAM(S): at 15:43

## 2018-10-18 RX ADMIN — Medication 100 MILLIGRAM(S): at 05:51

## 2018-10-18 RX ADMIN — OXYCODONE HYDROCHLORIDE 5 MILLIGRAM(S): 5 TABLET ORAL at 06:46

## 2018-10-18 RX ADMIN — Medication 75 MICROGRAM(S): at 05:52

## 2018-10-18 RX ADMIN — OXYCODONE HYDROCHLORIDE 5 MILLIGRAM(S): 5 TABLET ORAL at 05:51

## 2018-10-18 RX ADMIN — Medication 100 MILLIGRAM(S): at 21:36

## 2018-10-18 RX ADMIN — Medication 50 MILLIGRAM(S): at 05:52

## 2018-10-18 RX ADMIN — SENNA PLUS 2 TABLET(S): 8.6 TABLET ORAL at 21:37

## 2018-10-18 RX ADMIN — ENOXAPARIN SODIUM 40 MILLIGRAM(S): 100 INJECTION SUBCUTANEOUS at 01:05

## 2018-10-18 RX ADMIN — TAMSULOSIN HYDROCHLORIDE 0.4 MILLIGRAM(S): 0.4 CAPSULE ORAL at 21:37

## 2018-10-18 NOTE — PROGRESS NOTE ADULT - SUBJECTIVE AND OBJECTIVE BOX
No acute events over night. HDS, pain well controlled. Tolerating regular diet. Denies shortness of breath, chest pain, n/v, fever, chills. Denies numbness/tingling in extremities       MEDICATIONS  (STANDING):  ascorbic acid 500 milliGRAM(s) Oral two times a day  docusate sodium 100 milliGRAM(s) Oral three times a day  enoxaparin Injectable 40 milliGRAM(s) SubCutaneous every 24 hours  ferrous    sulfate 325 milliGRAM(s) Oral three times a day with meals  folic acid 1 milliGRAM(s) Oral daily  levothyroxine 75 MICROGram(s) Oral daily  losartan 50 milliGRAM(s) Oral daily  metoprolol tartrate 50 milliGRAM(s) Oral two times a day  multivitamin 1 Tablet(s) Oral daily  senna 2 Tablet(s) Oral at bedtime  tamsulosin 0.4 milliGRAM(s) Oral at bedtime    MEDICATIONS  (PRN):  ibuprofen  Tablet. 600 milliGRAM(s) Oral every 6 hours PRN Mild Pain (1 - 3)  ondansetron Injectable 4 milliGRAM(s) IV Push every 6 hours PRN Nausea and/or Vomiting  oxyCODONE    IR 10 milliGRAM(s) Oral every 4 hours PRN Severe Pain (7 - 10)  oxyCODONE    IR 5 milliGRAM(s) Oral every 4 hours PRN Moderate Pain (4 - 6)      Vital Signs Last 24 Hrs  T(C): 37.6 (17 Oct 2018 23:28), Max: 37.6 (17 Oct 2018 23:28)  T(F): 99.6 (17 Oct 2018 23:28), Max: 99.6 (17 Oct 2018 23:28)  HR: 87 (17 Oct 2018 23:28) (66 - 89)  BP: 115/66 (17 Oct 2018 23:28) (110/51 - 136/74)  BP(mean): --  RR: 18 (17 Oct 2018 23:28) (17 - 18)  SpO2: 96% (17 Oct 2018 23:28) (94% - 96%)    PE  Constitutional: patient resting comfortably in bed, easily awoken from sleep, in no acute distress  HEENT: forehead lac with overlying dressing clean & intact, EOMI / PERRL b/l  Respiratory: respirations are unlabored, no accessory muscle use, no conversational dyspnea  Cardiovascular: regular rate & rhythm  Gastrointestinal: abdomen soft, non-tender, non-distended, no rebound tenderness / guarding  Neurological: GCS: 15 (4/5/6). A&O x 3  Musculoskeletal: RLE with overlying ACE wrap C/D/I, KI in place, skin is warm, compartments soft, 2+ DP pulse, distal sensation & motor fxn grossly intact      I&O's Detail    16 Oct 2018 07:01  -  17 Oct 2018 07:00  --------------------------------------------------------  IN:    Oral Fluid: 300 mL    sodium chloride 0.9%: 1125 mL  Total IN: 1425 mL    OUT:    Indwelling Catheter - Urethral: 1500 mL    Voided: 2170 mL  Total OUT: 3670 mL    Total NET: -2245 mL      17 Oct 2018 07:01  -  18 Oct 2018 00:57  --------------------------------------------------------  IN:  Total IN: 0 mL    OUT:    Indwelling Catheter - Urethral: 1750 mL  Total OUT: 1750 mL    Total NET: -1750 mL          LABS:                        11.5   9.9   )-----------( 195      ( 17 Oct 2018 06:56 )             35.1     10-17    136  |  99  |  12.0  ----------------------------<  121<H>  4.5   |  27.0  |  0.91    Ca    8.9      17 Oct 2018 06:56            RADIOLOGY & ADDITIONAL STUDIES:

## 2018-10-19 RX ORDER — BACITRACIN ZINC 500 UNIT/G
1 OINTMENT IN PACKET (EA) TOPICAL DAILY
Qty: 0 | Refills: 0 | Status: DISCONTINUED | OUTPATIENT
Start: 2018-10-19 | End: 2018-10-20

## 2018-10-19 RX ORDER — BACITRACIN ZINC 500 UNIT/G
1 OINTMENT IN PACKET (EA) TOPICAL
Qty: 0 | Refills: 0 | COMMUNITY
Start: 2018-10-19

## 2018-10-19 RX ADMIN — TAMSULOSIN HYDROCHLORIDE 0.4 MILLIGRAM(S): 0.4 CAPSULE ORAL at 21:48

## 2018-10-19 RX ADMIN — ENOXAPARIN SODIUM 40 MILLIGRAM(S): 100 INJECTION SUBCUTANEOUS at 00:35

## 2018-10-19 RX ADMIN — LOSARTAN POTASSIUM 50 MILLIGRAM(S): 100 TABLET, FILM COATED ORAL at 05:33

## 2018-10-19 RX ADMIN — Medication 325 MILLIGRAM(S): at 18:47

## 2018-10-19 RX ADMIN — Medication 50 MILLIGRAM(S): at 18:48

## 2018-10-19 RX ADMIN — Medication 325 MILLIGRAM(S): at 11:55

## 2018-10-19 RX ADMIN — Medication 600 MILLIGRAM(S): at 21:49

## 2018-10-19 RX ADMIN — Medication 600 MILLIGRAM(S): at 22:49

## 2018-10-19 RX ADMIN — Medication 75 MICROGRAM(S): at 05:33

## 2018-10-19 RX ADMIN — Medication 100 MILLIGRAM(S): at 05:33

## 2018-10-19 RX ADMIN — Medication 1 MILLIGRAM(S): at 11:59

## 2018-10-19 RX ADMIN — Medication 50 MILLIGRAM(S): at 05:33

## 2018-10-19 RX ADMIN — Medication 325 MILLIGRAM(S): at 08:42

## 2018-10-19 RX ADMIN — Medication 500 MILLIGRAM(S): at 05:33

## 2018-10-19 RX ADMIN — Medication 1 TABLET(S): at 11:59

## 2018-10-19 RX ADMIN — Medication 500 MILLIGRAM(S): at 18:47

## 2018-10-19 RX ADMIN — Medication 1 APPLICATION(S): at 18:46

## 2018-10-19 NOTE — PROGRESS NOTE ADULT - PROBLEM SELECTOR PLAN 3
- Patient passed TOV - will be d/c'ed home with rx for flomax
- Flomax 0.4 mg QHS  - Keep armstrong catheter in place until pt OOB and more mobile - then will repeat TOV

## 2018-10-19 NOTE — PROGRESS NOTE ADULT - PROBLEM SELECTOR PLAN 2
- Continue bacitracin & local wound care  - Sutures will be reassessed on rounds today - possible removal prior to discharge
- Local wound care

## 2018-10-19 NOTE — PROGRESS NOTE ADULT - SUBJECTIVE AND OBJECTIVE BOX
Ortho Progress note    Name: JERARDO HARRELL    MR #: [id number]    Procedure: right patella ORIF  Surgeon: Dr. La    Pt seen resting comfortably in bed in NAD  Reports improvement of pain  Denies CP, SOB, N/V, numbness/tingling   no other complaints    General Exam:  Vital Signs Last 24 Hrs  T(C): 37 (10-19-18 @ 04:25), Max: 37 (10-19-18 @ 04:25)  T(F): 98.6 (10-19-18 @ 04:25), Max: 98.6 (10-19-18 @ 04:25)  HR: 86 (10-19-18 @ 04:25) (86 - 86)  BP: 124/71 (10-19-18 @ 04:25) (124/71 - 124/71)  BP(mean): --  RR: 18 (10-19-18 @ 04:25) (18 - 18)  SpO2: 93% (10-19-18 @ 04:25) (93% - 93%)    General: Pt Alert and oriented, NAD, controlled pain.  Dressing/ knee immobilizer C/D/I.   Pulses: 2+ dorsalis pedis pulse. Cap refill < 2 sec.  Sensation: Grossly intact to light touch without deficit.  Motor: + EHL/FHL/TA/GS    A/P: 68yMale POD#3 s/p right patella ORIF    - Pain Control  - elevation  - DVT ppx: lovenox while inpatient, DC home on ASA  - Continue PT  - Weight bearing status: WBAT RLE in knee immobilizer at all times  - Continue care per primary team  - DC planning

## 2018-10-19 NOTE — PROGRESS NOTE ADULT - SUBJECTIVE AND OBJECTIVE BOX
HPI/OVERNIGHT EVENTS: Patient seen and examined at bedside. He reports feeling well this morning. States pain to leg is minimal & well controlled with medications. Garcia removed this AM and patient states he voiding without difficulty - confirmed this with his RN. He denies numbness / tingling of RLE. Tolerating diet and having bowel fxn. Denies fever, chills, CP, or SOB.     MEDICATIONS  (STANDING):  ascorbic acid 500 milliGRAM(s) Oral two times a day  BACItracin   Ointment 1 Application(s) Topical daily  docusate sodium 100 milliGRAM(s) Oral three times a day  enoxaparin Injectable 40 milliGRAM(s) SubCutaneous every 24 hours  ferrous    sulfate 325 milliGRAM(s) Oral three times a day with meals  folic acid 1 milliGRAM(s) Oral daily  levothyroxine 75 MICROGram(s) Oral daily  losartan 50 milliGRAM(s) Oral daily  metoprolol tartrate 50 milliGRAM(s) Oral two times a day  multivitamin 1 Tablet(s) Oral daily  senna 2 Tablet(s) Oral at bedtime  tamsulosin 0.4 milliGRAM(s) Oral at bedtime    MEDICATIONS  (PRN):  ibuprofen  Tablet. 600 milliGRAM(s) Oral every 6 hours PRN Mild Pain (1 - 3)  ondansetron Injectable 4 milliGRAM(s) IV Push every 6 hours PRN Nausea and/or Vomiting  oxyCODONE    IR 10 milliGRAM(s) Oral every 4 hours PRN Severe Pain (7 - 10)  oxyCODONE    IR 5 milliGRAM(s) Oral every 4 hours PRN Moderate Pain (4 - 6)      Vital Signs Last 24 Hrs  T(C): 37 (19 Oct 2018 04:25), Max: 37.2 (19 Oct 2018 00:05)  T(F): 98.6 (19 Oct 2018 04:25), Max: 99 (19 Oct 2018 00:05)  HR: 86 (19 Oct 2018 04:25) (77 - 88)  BP: 124/71 (19 Oct 2018 04:25) (110/58 - 134/71)  BP(mean): --  RR: 18 (19 Oct 2018 04:25) (17 - 18)  SpO2: 93% (19 Oct 2018 04:25) (93% - 97%)    Constitutional: patient easily awoken from sleep, NAD  HEENT: Dressing over forehead laceration removed revealing non-absorbable sutures, wound well approximated, minimal oozing from inferior aspect, hemostasis achieved with direct pressure. Small gauze replaced. EOMI / PERRL b/l, no active drainage or redness  Neck: No JVD, ROM without pain  Respiratory: respirations are unlabored, no accessory muscle use, no conversational dyspnea  Cardiovascular: regular rate & rhythm  Gastrointestinal: abdomen soft, non-tender, non-distended, no rebound tenderness / guarding  : voiding independently  Neurological: GCS: 15 (4/5/6). A&O x 3  Musculoskeletal: RLE with overlying ACE dressing C/D/I - knee immobilizer in place. RLE compartments are soft, non-tender, 2+ DP pulse, distal sensation grossly intact    I&O's Detail    18 Oct 2018 07:01  -  19 Oct 2018 07:00  --------------------------------------------------------  IN:  Total IN: 0 mL    OUT:    Indwelling Catheter - Urethral: 2150 mL    Voided: 250 mL  Total OUT: 2400 mL    Total NET: -2400 mL          LABS:                        11.7   8.4   )-----------( 192      ( 18 Oct 2018 07:19 )             35.2     10-18    140  |  102  |  14.0  ----------------------------<  114  4.1   |  24.0  |  0.77    Ca    8.9      18 Oct 2018 07:19

## 2018-10-19 NOTE — PROGRESS NOTE ADULT - PROBLEM SELECTOR PLAN 1
- Patient is stable for d/c home today  - Orthopedic recommendations appreciated - patient is WBAT with knee immobilizer in place  - Physical therapy - rec is for home with home PT  - Pain control PRN  - D/c home with ASA as per ortho for DVT ppx  - Ambulation as tolerated  - Regular diet  - Incentive spirometer for pulm toileting
- WBAT with KI in place at all times  - Physical & occupational therapy ordered - will f/u their recommendations   - Pain control PRN - changed tylenol to ATC and added ibuprofen to regimen  - Bowel regimen with colace & senna  - Regular diet / IVL  - DVT ppx with lovenox & SCD to LLE  -  Incentive spirometer for pulm toileting

## 2018-10-20 VITALS
DIASTOLIC BLOOD PRESSURE: 68 MMHG | SYSTOLIC BLOOD PRESSURE: 120 MMHG | RESPIRATION RATE: 18 BRPM | HEART RATE: 69 BPM | TEMPERATURE: 99 F | OXYGEN SATURATION: 92 %

## 2018-10-20 RX ADMIN — LOSARTAN POTASSIUM 50 MILLIGRAM(S): 100 TABLET, FILM COATED ORAL at 06:33

## 2018-10-20 RX ADMIN — Medication 500 MILLIGRAM(S): at 06:33

## 2018-10-20 RX ADMIN — Medication 50 MILLIGRAM(S): at 06:33

## 2018-10-20 RX ADMIN — Medication 325 MILLIGRAM(S): at 10:26

## 2018-10-20 RX ADMIN — ENOXAPARIN SODIUM 40 MILLIGRAM(S): 100 INJECTION SUBCUTANEOUS at 01:56

## 2018-10-20 RX ADMIN — Medication 600 MILLIGRAM(S): at 06:35

## 2018-10-20 RX ADMIN — Medication 600 MILLIGRAM(S): at 07:30

## 2018-10-20 RX ADMIN — Medication 75 MICROGRAM(S): at 06:33

## 2018-10-20 NOTE — PROGRESS NOTE ADULT - SUBJECTIVE AND OBJECTIVE BOX
INTERVAL HPI/OVERNIGHT EVENTS:  Patient seen this AM with no major complaints. He is pending discharge today when his son comes to pick him up. Forehead sutures were removed this AM prior to discharge. Patient denied SOB, chest pain, headaches, nausea, vomiting nor diarrhea.      MEDICATIONS  (STANDING):  ascorbic acid 500 milliGRAM(s) Oral two times a day  BACItracin   Ointment 1 Application(s) Topical daily  docusate sodium 100 milliGRAM(s) Oral three times a day  enoxaparin Injectable 40 milliGRAM(s) SubCutaneous every 24 hours  ferrous    sulfate 325 milliGRAM(s) Oral three times a day with meals  folic acid 1 milliGRAM(s) Oral daily  levothyroxine 75 MICROGram(s) Oral daily  losartan 50 milliGRAM(s) Oral daily  metoprolol tartrate 50 milliGRAM(s) Oral two times a day  multivitamin 1 Tablet(s) Oral daily  senna 2 Tablet(s) Oral at bedtime  tamsulosin 0.4 milliGRAM(s) Oral at bedtime    MEDICATIONS  (PRN):  ibuprofen  Tablet. 600 milliGRAM(s) Oral every 6 hours PRN Mild Pain (1 - 3)  ondansetron Injectable 4 milliGRAM(s) IV Push every 6 hours PRN Nausea and/or Vomiting  oxyCODONE    IR 10 milliGRAM(s) Oral every 4 hours PRN Severe Pain (7 - 10)  oxyCODONE    IR 5 milliGRAM(s) Oral every 4 hours PRN Moderate Pain (4 - 6)      Vital Signs Last 24 Hrs  T(C): 37.1 (20 Oct 2018 08:11), Max: 37.3 (19 Oct 2018 20:48)  T(F): 98.7 (20 Oct 2018 08:11), Max: 99.2 (19 Oct 2018 20:48)  HR: 69 (20 Oct 2018 08:11) (69 - 90)  BP: 120/68 (20 Oct 2018 08:11) (112/65 - 120/68)  BP(mean): --  RR: 18 (20 Oct 2018 08:11) (18 - 18)  SpO2: 92% (20 Oct 2018 08:11) (92% - 94%)    PE  General: Patient not in acute distress  HEENT: Moist oral mucosa, EOMI, PERRLA, no lymphadenopathy   Respiratory: CTAB, no wheezing nor rales  Cardio: RRR, normal S1, S2, no murmurs, gallops nor rubs.    GI: Soft, non-tender, non-distended  MSK: No pitting edema b/l   Vascular: 2+ radial and PT pulses b/l  Neuro: AAOX3, no neurosensory deficits, GCS 15      I&O's Detail      LABS:                RADIOLOGY & ADDITIONAL STUDIES:

## 2018-10-20 NOTE — PROGRESS NOTE ADULT - ASSESSMENT
67 y/o male s/p ORIF of right patella secondary to a fall from a ladder, ready to be discharged    - WBAT with KI in place at all times   - Bowel regimen with colace & senna  - Regular diet   - DVT ppx with lovenox & SCD to LLE  -  Incentive spirometer for pulm toileting  -dispo: discharge to home
69 y/o male s/p fall sustaining patellar fx and forehead laceration - POD#1 s/o ORIF of right patella. Pt currently with pain to RLE - otherwise no complaints. Hemodynamically well, RLE is neurovascularly intact       - WBAT with KI in place at all times  - Physical & occupational therapy ordered - will f/u their recommendations   - Pain control PRN - changed tylenol to ATC and added ibuprofen to regimen  - Bowel regimen with colace & senna  - Regular diet / IVL  - DVT ppx with lovenox & SCD to LLE  -  Incentive spirometer for pulm toileting.
69 y/o male s/p repair of facial laceration & POD3 s/p ORIF right patella. Hemodynamically well, pain controlled. Garcia removed this AM and pt passed TOV.
69 y/o male s/p fall sustaining patellar fx and forehead laceration - POD#1 s/o ORIF of right patella. Pt currently with pain to RLE - otherwise no complaints. Hemodynamically well, RLE is neurovascularly intact

## 2018-10-20 NOTE — PROGRESS NOTE ADULT - PROVIDER SPECIALTY LIST ADULT
Orthopedics
Trauma Surgery

## 2018-10-22 RX ORDER — SIMVASTATIN 20 MG/1
1 TABLET, FILM COATED ORAL
Qty: 0 | Refills: 0 | COMMUNITY

## 2018-10-22 RX ORDER — METOPROLOL TARTRATE 50 MG
1 TABLET ORAL
Qty: 0 | Refills: 0 | COMMUNITY

## 2018-10-22 RX ORDER — LOSARTAN POTASSIUM 100 MG/1
1 TABLET, FILM COATED ORAL
Qty: 0 | Refills: 0 | COMMUNITY

## 2018-10-22 RX ORDER — ASPIRIN/CALCIUM CARB/MAGNESIUM 324 MG
81 TABLET ORAL
Qty: 0 | Refills: 0 | COMMUNITY

## 2018-10-22 RX ORDER — LEVOTHYROXINE SODIUM 125 MCG
75 TABLET ORAL
Qty: 0 | Refills: 0 | COMMUNITY

## 2018-11-08 ENCOUNTER — APPOINTMENT (OUTPATIENT)
Dept: ORTHOPEDIC SURGERY | Facility: CLINIC | Age: 68
End: 2018-11-08
Payer: MEDICARE

## 2018-11-08 PROBLEM — E78.5 HYPERLIPIDEMIA, UNSPECIFIED: Chronic | Status: ACTIVE | Noted: 2018-10-15

## 2018-11-08 PROBLEM — I10 ESSENTIAL (PRIMARY) HYPERTENSION: Chronic | Status: ACTIVE | Noted: 2018-10-15

## 2018-11-08 PROCEDURE — 99024 POSTOP FOLLOW-UP VISIT: CPT

## 2018-11-27 PROCEDURE — 80053 COMPREHEN METABOLIC PANEL: CPT

## 2018-11-27 PROCEDURE — 97163 PT EVAL HIGH COMPLEX 45 MIN: CPT

## 2018-11-27 PROCEDURE — 86900 BLOOD TYPING SEROLOGIC ABO: CPT

## 2018-11-27 PROCEDURE — 71045 X-RAY EXAM CHEST 1 VIEW: CPT

## 2018-11-27 PROCEDURE — 86850 RBC ANTIBODY SCREEN: CPT

## 2018-11-27 PROCEDURE — 72170 X-RAY EXAM OF PELVIS: CPT

## 2018-11-27 PROCEDURE — 97167 OT EVAL HIGH COMPLEX 60 MIN: CPT

## 2018-11-27 PROCEDURE — 80307 DRUG TEST PRSMV CHEM ANLYZR: CPT

## 2018-11-27 PROCEDURE — 97535 SELF CARE MNGMENT TRAINING: CPT

## 2018-11-27 PROCEDURE — 93005 ELECTROCARDIOGRAM TRACING: CPT

## 2018-11-27 PROCEDURE — 73552 X-RAY EXAM OF FEMUR 2/>: CPT

## 2018-11-27 PROCEDURE — 73610 X-RAY EXAM OF ANKLE: CPT

## 2018-11-27 PROCEDURE — 73590 X-RAY EXAM OF LOWER LEG: CPT

## 2018-11-27 PROCEDURE — 97110 THERAPEUTIC EXERCISES: CPT

## 2018-11-27 PROCEDURE — 86901 BLOOD TYPING SEROLOGIC RH(D): CPT

## 2018-11-27 PROCEDURE — 85730 THROMBOPLASTIN TIME PARTIAL: CPT

## 2018-11-27 PROCEDURE — 84443 ASSAY THYROID STIM HORMONE: CPT

## 2018-11-27 PROCEDURE — 81003 URINALYSIS AUTO W/O SCOPE: CPT

## 2018-11-27 PROCEDURE — 36415 COLL VENOUS BLD VENIPUNCTURE: CPT

## 2018-11-27 PROCEDURE — 76000 FLUOROSCOPY <1 HR PHYS/QHP: CPT

## 2018-11-27 PROCEDURE — 80048 BASIC METABOLIC PNL TOTAL CA: CPT

## 2018-11-27 PROCEDURE — C1713: CPT

## 2018-11-27 PROCEDURE — 97530 THERAPEUTIC ACTIVITIES: CPT

## 2018-11-27 PROCEDURE — 85027 COMPLETE CBC AUTOMATED: CPT

## 2018-11-27 PROCEDURE — T1013: CPT

## 2018-11-27 PROCEDURE — 85610 PROTHROMBIN TIME: CPT

## 2018-11-27 PROCEDURE — 73560 X-RAY EXAM OF KNEE 1 OR 2: CPT

## 2018-11-27 PROCEDURE — 82803 BLOOD GASES ANY COMBINATION: CPT

## 2018-11-27 PROCEDURE — 90471 IMMUNIZATION ADMIN: CPT

## 2018-11-27 PROCEDURE — 83690 ASSAY OF LIPASE: CPT

## 2018-11-27 PROCEDURE — 83605 ASSAY OF LACTIC ACID: CPT

## 2018-11-27 PROCEDURE — 96360 HYDRATION IV INFUSION INIT: CPT | Mod: XU

## 2018-11-27 PROCEDURE — 99285 EMERGENCY DEPT VISIT HI MDM: CPT | Mod: 25

## 2018-11-27 PROCEDURE — 73564 X-RAY EXAM KNEE 4 OR MORE: CPT

## 2018-11-27 PROCEDURE — C1889: CPT

## 2018-11-27 PROCEDURE — 73110 X-RAY EXAM OF WRIST: CPT

## 2018-11-27 PROCEDURE — 97116 GAIT TRAINING THERAPY: CPT

## 2018-11-27 PROCEDURE — 12011 RPR F/E/E/N/L/M 2.5 CM/<: CPT

## 2018-11-27 PROCEDURE — 73630 X-RAY EXAM OF FOOT: CPT

## 2018-12-11 ENCOUNTER — APPOINTMENT (OUTPATIENT)
Dept: ORTHOPEDIC SURGERY | Facility: CLINIC | Age: 68
End: 2018-12-11
Payer: MEDICARE

## 2018-12-11 PROCEDURE — 99024 POSTOP FOLLOW-UP VISIT: CPT

## 2018-12-11 PROCEDURE — 73560 X-RAY EXAM OF KNEE 1 OR 2: CPT | Mod: RT

## 2018-12-17 ENCOUNTER — OUTPATIENT (OUTPATIENT)
Dept: OUTPATIENT SERVICES | Facility: HOSPITAL | Age: 68
LOS: 1 days | End: 2018-12-17
Payer: MEDICARE

## 2018-12-17 DIAGNOSIS — Z51.89 ENCOUNTER FOR OTHER SPECIFIED AFTERCARE: ICD-10-CM

## 2018-12-17 DIAGNOSIS — S82.001D UNSPECIFIED FRACTURE OF RIGHT PATELLA, SUBSEQUENT ENCOUNTER FOR CLOSED FRACTURE WITH ROUTINE HEALING: ICD-10-CM

## 2019-02-21 PROCEDURE — 97010 HOT OR COLD PACKS THERAPY: CPT

## 2019-02-21 PROCEDURE — 97161 PT EVAL LOW COMPLEX 20 MIN: CPT

## 2019-02-21 PROCEDURE — G8979: CPT | Mod: CK

## 2019-02-21 PROCEDURE — G8978: CPT | Mod: CL

## 2019-02-21 PROCEDURE — 97110 THERAPEUTIC EXERCISES: CPT

## 2019-02-21 PROCEDURE — 97140 MANUAL THERAPY 1/> REGIONS: CPT

## 2019-12-17 ENCOUNTER — APPOINTMENT (OUTPATIENT)
Dept: ORTHOPEDIC SURGERY | Facility: CLINIC | Age: 69
End: 2019-12-17
Payer: MEDICARE

## 2019-12-17 VITALS
HEIGHT: 67 IN | SYSTOLIC BLOOD PRESSURE: 134 MMHG | DIASTOLIC BLOOD PRESSURE: 63 MMHG | BODY MASS INDEX: 28.25 KG/M2 | WEIGHT: 180 LBS | HEART RATE: 73 BPM

## 2019-12-17 DIAGNOSIS — M17.11 UNILATERAL PRIMARY OSTEOARTHRITIS, RIGHT KNEE: ICD-10-CM

## 2019-12-17 PROCEDURE — 73560 X-RAY EXAM OF KNEE 1 OR 2: CPT | Mod: RT

## 2019-12-17 PROCEDURE — 99214 OFFICE O/P EST MOD 30 MIN: CPT

## 2019-12-17 NOTE — PHYSICAL EXAM
[de-identified] : Physical Exam:\par General: Well appearing, no acute distress, A&O\par Neurologic: A&Ox3, No focal deficits\par Head: NCAT without abrasions, lacerations, or ecchymosis to head, face, or scalp\par Eyes: No scleral icterus, no gross abnormalities\par Respiratory: Equal chest wall expansion bilaterally, no accessory muscle use\par Lymphatic: No lymphadenopathy palpated\par Skin: Warm and dry\par Psychiatric: Normal mood and affect\par \par Right knee exam\par \par Skin is Clean, dry, intact midline well healed incision. No obvious malalignment, no masses, no swelling, no effusion\par Pulses: 2+ DP/PT pulses Knee ROM: RIGHT 0-120 degrees of flexion. No pain with deep knee flexion.\par Mild TTP medial and lateral joint line Distal motor and sensation intact [de-identified] : MRI of R knee with postoperative changes on quadriceps and patella tendon, no evidence of high grade tear.  Tricompartmental degenerative changes most pronounced in the medial femorotibial compartment.

## 2019-12-17 NOTE — HISTORY OF PRESENT ILLNESS
[Pain Location] : pain [Stable] : stable [2] : a current pain level of 2/10 [Intermit.] : ~He/She~ states the symptoms seem to be intermittent [Knee Flexion] : worsened with knee flexion [de-identified] : 68yo M presents for c/o Right knee pain he is over 1 year s/p ORIF of right patella fx.  He reports anterior knee pain with going up and down stairs.  He is able to ambulate without difficulties or assistance.  He reports his pain is aching.  He had completed PT over  yr ago.  He is not taking NSAID at this time. He had gone to his PCP and had a R knee MRI and presents for review.  [de-identified] : going up and down stairs

## 2019-12-17 NOTE — DISCUSSION/SUMMARY
[de-identified] : 70 yo M with Right knee OA s.p ORIF R patella fx.  Discussed R knee corticosteroid injection for pain control - pt declines, offered Pt for ROm and strengthening modalities - declined.  Discussed hyaluronic acid injections and evaluation with knee specialist.  He will f/u PRN\par \par Orthopaedic Trauma Surgeon Addendum:\par \par I agree with the above nurse practitioner note.  Appropriate imaging has been reviewed and the plan adjusted as needed.\par \par Conservative measures of treatment include rest until asymptomatic, activity avoidance, NSAID's PRN, acetaminophen, application to ice to the area 2-3x daily for 20 minutes, with gradual return to activities.\par \par Trevon La MD\par Orthopaedic Trauma Surgeon\par [facility]\par [facility] [place]\par

## 2020-05-11 NOTE — ED PROVIDER NOTE - DATE/TIME 1
Received request via: Pharmacy    Was the patient seen in the last year in this department? Yes    Does the patient have an active prescription (recently filled or refills available) for medication(s) requested? No  
15-Oct-2018 16:03

## 2020-06-16 ENCOUNTER — APPOINTMENT (OUTPATIENT)
Dept: ORTHOPEDIC SURGERY | Facility: CLINIC | Age: 70
End: 2020-06-16
Payer: MEDICARE

## 2020-06-16 VITALS
TEMPERATURE: 97.1 F | SYSTOLIC BLOOD PRESSURE: 137 MMHG | HEIGHT: 67 IN | HEART RATE: 77 BPM | DIASTOLIC BLOOD PRESSURE: 78 MMHG | BODY MASS INDEX: 28.25 KG/M2 | WEIGHT: 180 LBS

## 2020-06-16 PROCEDURE — 73560 X-RAY EXAM OF KNEE 1 OR 2: CPT | Mod: RT

## 2020-06-16 PROCEDURE — 99214 OFFICE O/P EST MOD 30 MIN: CPT

## 2020-06-23 ENCOUNTER — OUTPATIENT (OUTPATIENT)
Dept: OUTPATIENT SERVICES | Facility: HOSPITAL | Age: 70
LOS: 1 days | End: 2020-06-23
Payer: MEDICARE

## 2020-06-23 DIAGNOSIS — Z01.818 ENCOUNTER FOR OTHER PREPROCEDURAL EXAMINATION: ICD-10-CM

## 2020-06-23 LAB
A1C WITH ESTIMATED AVERAGE GLUCOSE RESULT: 5.5 % — SIGNIFICANT CHANGE UP (ref 4–5.6)
ANION GAP SERPL CALC-SCNC: 13 MMOL/L — SIGNIFICANT CHANGE UP (ref 5–17)
APTT BLD: 27.6 SEC — SIGNIFICANT CHANGE UP (ref 27.5–36.3)
BASOPHILS # BLD AUTO: 0.03 K/UL — SIGNIFICANT CHANGE UP (ref 0–0.2)
BASOPHILS NFR BLD AUTO: 0.5 % — SIGNIFICANT CHANGE UP (ref 0–2)
BUN SERPL-MCNC: 22 MG/DL — HIGH (ref 8–20)
CALCIUM SERPL-MCNC: 9.9 MG/DL — SIGNIFICANT CHANGE UP (ref 8.6–10.2)
CHLORIDE SERPL-SCNC: 104 MMOL/L — SIGNIFICANT CHANGE UP (ref 98–107)
CO2 SERPL-SCNC: 23 MMOL/L — SIGNIFICANT CHANGE UP (ref 22–29)
CREAT SERPL-MCNC: 0.83 MG/DL — SIGNIFICANT CHANGE UP (ref 0.5–1.3)
EOSINOPHIL # BLD AUTO: 0.03 K/UL — SIGNIFICANT CHANGE UP (ref 0–0.5)
EOSINOPHIL NFR BLD AUTO: 0.5 % — SIGNIFICANT CHANGE UP (ref 0–6)
ESTIMATED AVERAGE GLUCOSE: 111 MG/DL — SIGNIFICANT CHANGE UP (ref 68–114)
GLUCOSE SERPL-MCNC: 129 MG/DL — HIGH (ref 70–99)
HCT VFR BLD CALC: 40 % — SIGNIFICANT CHANGE UP (ref 39–50)
HGB BLD-MCNC: 13.5 G/DL — SIGNIFICANT CHANGE UP (ref 13–17)
IMM GRANULOCYTES NFR BLD AUTO: 0.5 % — SIGNIFICANT CHANGE UP (ref 0–1.5)
INR BLD: 0.99 RATIO — SIGNIFICANT CHANGE UP (ref 0.88–1.16)
LYMPHOCYTES # BLD AUTO: 1.49 K/UL — SIGNIFICANT CHANGE UP (ref 1–3.3)
LYMPHOCYTES # BLD AUTO: 25.6 % — SIGNIFICANT CHANGE UP (ref 13–44)
MCHC RBC-ENTMCNC: 31.8 PG — SIGNIFICANT CHANGE UP (ref 27–34)
MCHC RBC-ENTMCNC: 33.8 GM/DL — SIGNIFICANT CHANGE UP (ref 32–36)
MCV RBC AUTO: 94.3 FL — SIGNIFICANT CHANGE UP (ref 80–100)
MONOCYTES # BLD AUTO: 0.5 K/UL — SIGNIFICANT CHANGE UP (ref 0–0.9)
MONOCYTES NFR BLD AUTO: 8.6 % — SIGNIFICANT CHANGE UP (ref 2–14)
NEUTROPHILS # BLD AUTO: 3.74 K/UL — SIGNIFICANT CHANGE UP (ref 1.8–7.4)
NEUTROPHILS NFR BLD AUTO: 64.3 % — SIGNIFICANT CHANGE UP (ref 43–77)
PLATELET # BLD AUTO: 218 K/UL — SIGNIFICANT CHANGE UP (ref 150–400)
POTASSIUM SERPL-MCNC: 4.3 MMOL/L — SIGNIFICANT CHANGE UP (ref 3.5–5.3)
POTASSIUM SERPL-SCNC: 4.3 MMOL/L — SIGNIFICANT CHANGE UP (ref 3.5–5.3)
PROTHROM AB SERPL-ACNC: 11.2 SEC — SIGNIFICANT CHANGE UP (ref 10–12.9)
RBC # BLD: 4.24 M/UL — SIGNIFICANT CHANGE UP (ref 4.2–5.8)
RBC # FLD: 12.7 % — SIGNIFICANT CHANGE UP (ref 10.3–14.5)
SODIUM SERPL-SCNC: 140 MMOL/L — SIGNIFICANT CHANGE UP (ref 135–145)
WBC # BLD: 5.82 K/UL — SIGNIFICANT CHANGE UP (ref 3.8–10.5)
WBC # FLD AUTO: 5.82 K/UL — SIGNIFICANT CHANGE UP (ref 3.8–10.5)

## 2020-06-23 PROCEDURE — 93010 ELECTROCARDIOGRAM REPORT: CPT

## 2020-06-23 PROCEDURE — 80048 BASIC METABOLIC PNL TOTAL CA: CPT

## 2020-06-23 PROCEDURE — 93005 ELECTROCARDIOGRAM TRACING: CPT

## 2020-06-23 PROCEDURE — 36415 COLL VENOUS BLD VENIPUNCTURE: CPT

## 2020-06-23 PROCEDURE — 85610 PROTHROMBIN TIME: CPT

## 2020-06-23 PROCEDURE — G0463: CPT

## 2020-06-23 PROCEDURE — 85730 THROMBOPLASTIN TIME PARTIAL: CPT

## 2020-06-23 PROCEDURE — 85027 COMPLETE CBC AUTOMATED: CPT

## 2020-06-23 PROCEDURE — 83036 HEMOGLOBIN GLYCOSYLATED A1C: CPT

## 2020-06-25 DIAGNOSIS — Z01.818 ENCOUNTER FOR OTHER PREPROCEDURAL EXAMINATION: ICD-10-CM

## 2020-06-27 ENCOUNTER — APPOINTMENT (OUTPATIENT)
Dept: DISASTER EMERGENCY | Facility: CLINIC | Age: 70
End: 2020-06-27

## 2020-06-28 LAB — SARS-COV-2 N GENE NPH QL NAA+PROBE: NOT DETECTED

## 2020-06-30 ENCOUNTER — APPOINTMENT (OUTPATIENT)
Dept: ORTHOPEDIC SURGERY | Facility: AMBULATORY SURGERY CENTER | Age: 70
End: 2020-06-30
Payer: MEDICARE

## 2020-06-30 PROCEDURE — 27607 TREAT LOWER LEG BONE LESION: CPT | Mod: RT

## 2020-06-30 RX ORDER — SULFAMETHOXAZOLE AND TRIMETHOPRIM 800; 160 MG/1; MG/1
800-160 TABLET ORAL
Qty: 20 | Refills: 0 | Status: ACTIVE | COMMUNITY
Start: 2020-06-30 | End: 1900-01-01

## 2020-06-30 RX ORDER — TRAMADOL HYDROCHLORIDE 50 MG/1
50 TABLET, COATED ORAL EVERY 6 HOURS
Qty: 30 | Refills: 0 | Status: ACTIVE | COMMUNITY
Start: 2020-06-30 | End: 1900-01-01

## 2020-07-14 ENCOUNTER — APPOINTMENT (OUTPATIENT)
Dept: ORTHOPEDIC SURGERY | Facility: CLINIC | Age: 70
End: 2020-07-14
Payer: MEDICARE

## 2020-07-14 VITALS — TEMPERATURE: 96.9 F

## 2020-07-14 PROCEDURE — 99024 POSTOP FOLLOW-UP VISIT: CPT

## 2020-07-14 NOTE — HISTORY OF PRESENT ILLNESS
[2] : the patient reports pain that is 2/10 in severity [Swelling] : swollen [Vascular Intact] : ~T peripheral vascular exam normal [Neuro Intact] : an unremarkable neurological exam [Doing Well] : is doing well [Adequate Pain Control] : has adequate pain control [Chills] : no chills [Constipation] : no constipation [Diarrhea] : no diarrhea [Dysuria] : no dysuria [Fever] : no fever [Nausea] : no nausea [Vomiting] : no vomiting [de-identified] : right knee implant removal DOS 6/30/2020 [de-identified] : 69 year old Male presents for c/o Right knee pain s/p ORIF of right patella fx now s/p ANALILIA patella.  He was having some issues with mild drainage from the anterior knee for the first few days following surgery.  A friend told him to make a home remedy with fig leaves.  He boiled the fig leaves and then put them directly onto the wound.  The drainage has stopped but now he has a rash on the anterior knee and on the hand. [de-identified] : Physical Exam:\par General: Well appearing, no acute distress, A&O\par Neurologic: A&Ox3, No focal deficits\par Head: NCAT without abrasions, lacerations, or ecchymosis to head, face, or scalp\par Respiratory: Equal chest wall expansion bilaterally, no accessory muscle use\par Lymphatic: No lymphadenopathy palpated\par Skin: Warm and dry\par Psychiatric: Normal mood and affect\par \par RLE:\par Skin over anterior knee is raised, erythematous but blanchable. \par Sutures in place\par Central portion of incision has small eschar with no active drainage\par SILT s/s/sp/dp/t\par Fires EHL/FHL/GS/TA\par 2+ DP/PT pulse\par brisk capillary refill [de-identified] : The wound is healing reasonably well but the sutures are not quite ready to be removed.  Especially given the significant rash that he has on the anterior knee, we will wait about a week.  He will come back to the office next week for repeat wound check and possible suture removal.  I would discourage the use of additional home remedies for the time being.  Recommend over-the-counter Benadryl or Benadryl cream to decrease the itching and raised rash.  If the wound worsens, he may need to go to wound care, but hopefully he will continue to improve.  We will see him back next week.  The patient was given the opportunity to ask questions and all questions were answered to their satisfaction.\par \par Trevon La MD\par Orthopaedic Trauma Surgeon\par [facility] [place]\par Director Orthopaedic Trauma, [facility]\par \par \par \par

## 2020-07-21 ENCOUNTER — APPOINTMENT (OUTPATIENT)
Dept: ORTHOPEDIC SURGERY | Facility: CLINIC | Age: 70
End: 2020-07-21
Payer: MEDICARE

## 2020-07-21 VITALS — TEMPERATURE: 96.5 F

## 2020-07-21 DIAGNOSIS — S82.001D UNSPECIFIED FRACTURE OF RIGHT PATELLA, SUBSEQUENT ENCOUNTER FOR CLOSED FRACTURE WITH ROUTINE HEALING: ICD-10-CM

## 2020-07-21 PROCEDURE — 73560 X-RAY EXAM OF KNEE 1 OR 2: CPT | Mod: RT

## 2020-07-21 PROCEDURE — 99024 POSTOP FOLLOW-UP VISIT: CPT

## 2020-07-21 NOTE — HISTORY OF PRESENT ILLNESS
[2] : the patient reports pain that is 2/10 in severity [Chills] : no chills [Constipation] : no constipation [Diarrhea] : no diarrhea [Dysuria] : no dysuria [Fever] : no fever [Nausea] : no nausea [Neuro Intact] : an unremarkable neurological exam [Swelling] : swollen [Vomiting] : no vomiting [Vascular Intact] : ~T peripheral vascular exam normal [Doing Well] : is doing well [Excellent Pain Control] : has excellent pain control [No Sign of Infection] : is showing no signs of infection [de-identified] : 69 year old Male presents for c/o Right knee pain s/p ORIF of right patella fx now s/p ANALILIA patella.  He was having some issues with mild drainage from the anterior knee for the first few days following surgery.  A friend told him to make a home remedy with fig leaves.  He boiled the fig leaves and then put them directly onto the wound.  The drainage has stopped but now he has a rash on the anterior knee and on the hand. It is improving since the last visit [de-identified] : s/p removal of implants, right patella x3. Excisional debridement to and including bone, right patella. Excision of sinus tracts, right anterior knee. 6/30/2020 [de-identified] : Physical Exam:\par General: Well appearing, no acute distress, A&O\par Neurologic: A&Ox3, No focal deficits\par Head: NCAT without abrasions, lacerations, or ecchymosis to head, face, or scalp\par Respiratory: Equal chest wall expansion bilaterally, no accessory muscle use\par Lymphatic: No lymphadenopathy palpated\par Skin: Warm and dry\par Psychiatric: Normal mood and affect\par \par RLE:\par Skin over anterior knee is raised, erythematous but blanchable. \par Sutures in place\par Central portion of incision has small eschar with no active drainage\par SILT s/s/sp/dp/t\par Fires EHL/FHL/GS/TA\par 2+ DP/PT pulse\par brisk capillary refill [de-identified] : The wound is healing reasonably well & the sutures are ready to be removed.  The significant rash that he has on the anterior knee is improving as well.  I would discourage the use of additional home remedies for the time being.  Recommend over-the-counter Benadryl or Benadryl cream to decrease the itching and raised rash.  If the wound worsens, he may need to go to wound care, but hopefully he will continue to improve.  We will see him back as needed.  The patient was given the opportunity to ask questions and all questions were answered to their satisfaction.\par \par Trevon La MD\par Orthopaedic Trauma Surgeon\par [facility] [place]\par Director Orthopaedic Trauma, [facility]\par \par \par \par  [de-identified] : plain films of the right knee were obtained today. well healed patella fracture

## 2022-11-10 ENCOUNTER — OFFICE (OUTPATIENT)
Dept: URBAN - METROPOLITAN AREA CLINIC 94 | Facility: CLINIC | Age: 72
Setting detail: OPHTHALMOLOGY
End: 2022-11-10
Payer: MEDICARE

## 2022-11-10 DIAGNOSIS — H11.153: ICD-10-CM

## 2022-11-10 DIAGNOSIS — H40.033: ICD-10-CM

## 2022-11-10 DIAGNOSIS — H52.4: ICD-10-CM

## 2022-11-10 DIAGNOSIS — H25.13: ICD-10-CM

## 2022-11-10 DIAGNOSIS — H52.03: ICD-10-CM

## 2022-11-10 PROCEDURE — 92012 INTRM OPH EXAM EST PATIENT: CPT | Performed by: OPHTHALMOLOGY

## 2022-11-10 ASSESSMENT — REFRACTION_CURRENTRX
OD_CYLINDER: -0.75
OD_VPRISM_DIRECTION: BF
OS_CYLINDER: -0.75
OD_SPHERE: +1.00
OD_ADD: +2.25
OS_VPRISM_DIRECTION: BF
OD_OVR_VA: 20/
OS_OVR_VA: 20/
OS_SPHERE: +0.75
OS_ADD: +2.25
OD_AXIS: 075
OS_AXIS: 090

## 2022-11-10 ASSESSMENT — SPHEQUIV_DERIVED
OD_SPHEQUIV: 2.125
OS_SPHEQUIV: 1.375
OD_SPHEQUIV: 1.375
OS_SPHEQUIV: 1.875

## 2022-11-10 ASSESSMENT — REFRACTION_MANIFEST
OS_VA1: 20/25+
OD_VA1: 20/25+
OS_CYLINDER: -1.25
OS_AXIS: 95
OD_SPHERE: +1.75
OD_CYLINDER: -0.75
OD_ADD: +2.25
OS_SPHERE: +2.00
OD_AXIS: 85
OS_ADD: +2.25

## 2022-11-10 ASSESSMENT — KERATOMETRY
OS_AXISANGLE_DEGREES: 177
OD_AXISANGLE_DEGREES: 180
OD_K2POWER_DIOPTERS: 44.00
OD_K1POWER_DIOPTERS: 43.50
METHOD_AUTO_MANUAL: AUTO
OS_K1POWER_DIOPTERS: 43.25
OS_K2POWER_DIOPTERS: 44.50

## 2022-11-10 ASSESSMENT — CONFRONTATIONAL VISUAL FIELD TEST (CVF)
OD_FINDINGS: FULL
OS_FINDINGS: FULL

## 2022-11-10 ASSESSMENT — REFRACTION_AUTOREFRACTION
OD_CYLINDER: -1.25
OS_AXIS: 090
OD_AXIS: 092
OS_CYLINDER: -1.75
OS_SPHERE: +2.75
OD_SPHERE: +2.75

## 2022-11-10 ASSESSMENT — AXIALLENGTH_DERIVED
OD_AL: 22.9806
OS_AL: 22.9371
OS_AL: 22.7545
OD_AL: 22.7067

## 2022-11-10 ASSESSMENT — VISUAL ACUITY
OS_BCVA: 20/30
OD_BCVA: 20/25

## 2022-11-10 ASSESSMENT — TONOMETRY
OD_IOP_MMHG: 18
OS_IOP_MMHG: 15

## 2023-11-16 ENCOUNTER — OFFICE (OUTPATIENT)
Dept: URBAN - METROPOLITAN AREA CLINIC 94 | Facility: CLINIC | Age: 73
Setting detail: OPHTHALMOLOGY
End: 2023-11-16
Payer: MEDICARE

## 2023-11-16 DIAGNOSIS — H11.153: ICD-10-CM

## 2023-11-16 DIAGNOSIS — H52.03: ICD-10-CM

## 2023-11-16 DIAGNOSIS — H52.4: ICD-10-CM

## 2023-11-16 DIAGNOSIS — H25.13: ICD-10-CM

## 2023-11-16 DIAGNOSIS — H40.033: ICD-10-CM

## 2023-11-16 PROCEDURE — 92014 COMPRE OPH EXAM EST PT 1/>: CPT | Performed by: OPHTHALMOLOGY

## 2023-11-16 ASSESSMENT — REFRACTION_CURRENTRX
OD_SPHERE: +1.00
OS_CYLINDER: -0.75
OS_VPRISM_DIRECTION: BF
OD_AXIS: 075
OD_CYLINDER: -0.75
OS_SPHERE: +0.75
OS_ADD: +2.25
OS_AXIS: 090
OD_VPRISM_DIRECTION: BF
OS_OVR_VA: 20/
OD_ADD: +2.25
OD_OVR_VA: 20/

## 2023-11-16 ASSESSMENT — REFRACTION_AUTOREFRACTION
OS_SPHERE: +3.25
OS_CYLINDER: -2.00
OD_SPHERE: +3.00
OD_AXIS: 100
OS_AXIS: 098
OD_CYLINDER: -1.50

## 2023-11-16 ASSESSMENT — REFRACTION_MANIFEST
OS_VA1: 20/25+
OS_SPHERE: +2.00
OD_AXIS: 85
OS_AXIS: 95
OD_VA1: 20/25+
OD_SPHERE: +1.75
OS_ADD: +2.25
OS_CYLINDER: -1.25
OD_ADD: +2.25
OD_CYLINDER: -0.75

## 2023-11-16 ASSESSMENT — SPHEQUIV_DERIVED
OD_SPHEQUIV: 1.375
OS_SPHEQUIV: 1.375
OD_SPHEQUIV: 2.25
OS_SPHEQUIV: 2.25

## 2023-11-16 ASSESSMENT — CONFRONTATIONAL VISUAL FIELD TEST (CVF)
OD_FINDINGS: FULL
OS_FINDINGS: FULL

## 2023-11-22 NOTE — PROGRESS NOTE ADULT - SUBJECTIVE AND OBJECTIVE BOX
Attempted to contact patient and was unsuccessful, message left on voicemail to return call.    Patient seen and eval at bedside with hospital , [facility]. Patient c/o some pain at operative site improved with pain medication that he took recently. Patient denies CP, SOB, dizziness, numbness/tingling.    Vital Signs Last 24 Hrs  T(C): 37.6 (18 Oct 2018 04:33), Max: 37.6 (17 Oct 2018 23:28)  T(F): 99.6 (18 Oct 2018 04:33), Max: 99.6 (17 Oct 2018 23:28)  HR: 87 (18 Oct 2018 04:33) (66 - 89)  BP: 131/65 (18 Oct 2018 04:33) (110/51 - 134/76)  BP(mean): --  RR: 18 (18 Oct 2018 04:33) (17 - 18)  SpO2: 96% (18 Oct 2018 04:33) (94% - 96%)    PE: NAD, alert awake  Right LE: Dressing C/D/I, no drainage or bleeding, staples intact, no erythema or blisters, + ecchymosis medial aspect of knee, mild swelling.  New dry sterile gauze dressing placed over incision site secured with tegaderm.  EHL/TA/GS/FHL intact, Gross SILT s/s/DP/SP/tib distrib  DP pulse 2+, calf soft, NT b/l  Garcia catheter in place.                          11.7   8.4   )-----------( 192      ( 18 Oct 2018 07:19 )             35.2     10-17    136  |  99  |  12.0  ----------------------------<  121<H>  4.5   |  27.0  |  0.91    Ca    8.9      17 Oct 2018 06:56    A/P: s/p right patella ORIF POD#2  ·	WBAT in knee immobilizer at all times, Keep right knee in extension  ·	DVT propx: lovenox, recommend DC on ASA x 4 weeks  ·	Pain control  ·	Garcia to be removed as per primary team  ·	Cont care as per primary team

## 2024-11-14 ENCOUNTER — OFFICE (OUTPATIENT)
Dept: URBAN - METROPOLITAN AREA CLINIC 94 | Facility: CLINIC | Age: 74
Setting detail: OPHTHALMOLOGY
End: 2024-11-14
Payer: MEDICARE

## 2024-11-14 DIAGNOSIS — H40.033: ICD-10-CM

## 2024-11-14 DIAGNOSIS — H11.153: ICD-10-CM

## 2024-11-14 DIAGNOSIS — H25.13: ICD-10-CM

## 2024-11-14 DIAGNOSIS — H52.03: ICD-10-CM

## 2024-11-14 PROCEDURE — 92014 COMPRE OPH EXAM EST PT 1/>: CPT | Performed by: OPHTHALMOLOGY

## 2024-11-14 ASSESSMENT — KERATOMETRY
OD_K1POWER_DIOPTERS: 44.00
OD_AXISANGLE_DEGREES: 090
OS_AXISANGLE_DEGREES: 175
METHOD_AUTO_MANUAL: AUTO
OD_K2POWER_DIOPTERS: 44.00
OS_K1POWER_DIOPTERS: 43.50
OS_K2POWER_DIOPTERS: 44.50

## 2024-11-14 ASSESSMENT — REFRACTION_MANIFEST
OD_ADD: +2.50
OS_ADD: +2.25
OD_CYLINDER: -0.75
OS_VA1: 20/25+
OS_ADD: +2.50
OD_ADD: +2.25
OS_SPHERE: +2.00
OS_VA1: 20/25
OD_VA1: 20/25
OS_AXIS: 95
OD_SPHERE: +1.75
OD_AXIS: 85
OD_VA1: 20/25+
OD_SPHERE: +2.50
OS_AXIS: 95
OD_AXIS: 95
OD_CYLINDER: -0.75
OS_CYLINDER: -1.25
OS_CYLINDER: -1.25
OS_SPHERE: +2.25

## 2024-11-14 ASSESSMENT — REFRACTION_CURRENTRX
OS_OVR_VA: 20/
OS_ADD: +2.25
OS_SPHERE: +2.00
OD_VPRISM_DIRECTION: BF
OD_OVR_VA: 20/
OD_SPHERE: +1.00
OD_AXIS: 080
OD_VPRISM_DIRECTION: BF
OS_OVR_VA: 20/
OS_SPHERE: +0.75
OD_CYLINDER: -0.75
OS_VPRISM_DIRECTION: BF
OS_AXIS: 090
OS_CYLINDER: -0.75
OD_ADD: +2.25
OS_VPRISM_DIRECTION: BF
OS_CYLINDER: -1.25
OD_SPHERE: +1.75
OS_AXIS: 090
OD_AXIS: 075
OD_CYLINDER: -1.25
OS_ADD: +2.25
OD_ADD: +2.25
OD_OVR_VA: 20/

## 2024-11-14 ASSESSMENT — CONFRONTATIONAL VISUAL FIELD TEST (CVF)
OD_FINDINGS: FULL
OS_FINDINGS: FULL

## 2024-11-14 ASSESSMENT — VISUAL ACUITY
OD_BCVA: 20/20
OS_BCVA: 20/70-1

## 2024-11-14 ASSESSMENT — REFRACTION_AUTOREFRACTION
OS_SPHERE: +3.00
OD_CYLINDER: -1.25
OS_CYLINDER: -1.75
OD_SPHERE: +3.00
OD_AXIS: 095
OS_AXIS: 095

## 2024-11-14 ASSESSMENT — TONOMETRY
OD_IOP_MMHG: 16
OS_IOP_MMHG: 15

## 2024-11-20 NOTE — PROGRESS NOTE ADULT - SUBJECTIVE AND OBJECTIVE BOX
Call to Danna Gonsalez that procedure was scheduled for 11/25/2024 and that [facility] should call her a few days before for the pre op call and between 2:00 PM and 4:00 PM  the business day before with the arrival time. Instructed Danna to hold ibuprofen for 24 hours, Celebrex, Mobic, and naprosyn for 4 days and any aspirin containing products, CoQ 10, or fish oil for 7 days. Instructed to call office back if any questions. Danna verbalized understanding.    Electronically signed by Naomie López RN on 11/20/2024 at 1:30 PM   Orthopedic PA Postop Note  Patient S/P RIGHT PATELLA ORIF  Patient in bed comfortable   RIGHT Leg  Dressing C/D/I - ACE wrap without staining - Knee immobilizer in the appropriate positioning  DP Pulse intact   Calf Soft NT  Dorsi/Plantar Flexion/EHL/FHL intact   Sensation intact to light touch    Vital Signs Last 24 Hrs  T(C): 36.9 (16 Oct 2018 19:17), Max: 37.2 (16 Oct 2018 04:27)  T(F): 98.5 (16 Oct 2018 19:17), Max: 99 (16 Oct 2018 04:27)  HR: 88 (16 Oct 2018 19:17) (61 - 90)  BP: 134/66 (16 Oct 2018 19:17) (106/61 - 144/82)  BP(mean): --  RR: 18 (16 Oct 2018 19:17) (11 - 18)  SpO2: 93% (16 Oct 2018 19:17) (93% - 99%)    < from: Xray Knee 1 or 2 Views, Right (10.16.18 @ 12:59) >   EXAM:  KNEE-RIGHT                          PROCEDURE DATE:  10/16/2018          INTERPRETATION:  TECHNIQUE: Three views right knee    COMPARISON: 10/15/2018    CLINICAL HISTORY: Patella ORIF    FINDINGS:    Frontal, lateral and oblique views of the right knee shows no evidence   for acute fracture, subluxation or dislocation. Patient is status post 2   screw and cerclage wire fixation of the patella fracture. Midline   cutaneous staples. Postoperative soft tissue swelling and air. Hardware   is in satisfactory position. Mild tricompartment degenerative changes.    IMPRESSION:   Status post ORIF of the patella.                FELY STOVER M.D., ATTENDING RADIOLOGIST  This document has been electronically signed. Oct 16 2018  3:28PM    < end of copied text >      A/P: 68M S/P RIGHT PATELLA open reduction internal fixation  1. DVTP - LOVENOX  2. Physical Therapy   3. Pain Control as clinically indicated